# Patient Record
Sex: FEMALE | Race: WHITE | Employment: UNEMPLOYED | URBAN - NONMETROPOLITAN AREA
[De-identification: names, ages, dates, MRNs, and addresses within clinical notes are randomized per-mention and may not be internally consistent; named-entity substitution may affect disease eponyms.]

---

## 2017-07-25 ENCOUNTER — OFFICE VISIT (OUTPATIENT)
Dept: PRIMARY CARE CLINIC | Age: 42
End: 2017-07-25

## 2017-07-25 VITALS
TEMPERATURE: 99 F | HEART RATE: 89 BPM | OXYGEN SATURATION: 98 % | WEIGHT: 149 LBS | BODY MASS INDEX: 24.83 KG/M2 | HEIGHT: 65 IN

## 2017-07-25 DIAGNOSIS — L55.1 SUNBURN, BLISTERING: Primary | ICD-10-CM

## 2017-07-25 PROCEDURE — 99212 OFFICE O/P EST SF 10 MIN: CPT | Performed by: NURSE PRACTITIONER

## 2017-07-25 RX ORDER — PREDNISONE 10 MG/1
10 TABLET ORAL DAILY
Qty: 43 TABLET | Refills: 0 | Status: SHIPPED | OUTPATIENT
Start: 2017-07-25 | End: 2017-08-06

## 2017-07-25 ASSESSMENT — ENCOUNTER SYMPTOMS: GASTROINTESTINAL NEGATIVE: 1

## 2017-10-13 RX ORDER — VENLAFAXINE HYDROCHLORIDE 150 MG/1
150 CAPSULE, EXTENDED RELEASE ORAL DAILY
Qty: 30 CAPSULE | Refills: 11 | Status: SHIPPED | OUTPATIENT
Start: 2017-10-13 | End: 2017-11-01 | Stop reason: SDUPTHER

## 2017-10-13 NOTE — TELEPHONE ENCOUNTER
Received fax from pharmacy requesting refill on pts medication(s). Pt was last seen in office on 7/25/17 and has no follow up scheduled . Will send request to pharmacy for pt.     Requested Prescriptions     Signed Prescriptions Disp Refills    venlafaxine (EFFEXOR XR) 150 MG extended release capsule 30 capsule 11     Sig: Take 1 capsule by mouth daily     Authorizing Provider: Criss Alicea     Ordering User: Montserrat Clarke

## 2017-11-01 ENCOUNTER — OFFICE VISIT (OUTPATIENT)
Dept: PRIMARY CARE CLINIC | Age: 42
End: 2017-11-01

## 2017-11-01 VITALS
TEMPERATURE: 96.9 F | OXYGEN SATURATION: 96 % | BODY MASS INDEX: 25.74 KG/M2 | HEART RATE: 89 BPM | HEIGHT: 65 IN | SYSTOLIC BLOOD PRESSURE: 118 MMHG | DIASTOLIC BLOOD PRESSURE: 72 MMHG | WEIGHT: 154.5 LBS

## 2017-11-01 DIAGNOSIS — F41.9 ANXIETY: ICD-10-CM

## 2017-11-01 DIAGNOSIS — F33.41 RECURRENT MAJOR DEPRESSIVE DISORDER, IN PARTIAL REMISSION (HCC): Primary | ICD-10-CM

## 2017-11-01 PROCEDURE — 99213 OFFICE O/P EST LOW 20 MIN: CPT | Performed by: PEDIATRICS

## 2017-11-01 RX ORDER — VENLAFAXINE HYDROCHLORIDE 75 MG/1
150 CAPSULE, EXTENDED RELEASE ORAL DAILY
Qty: 30 CAPSULE | Refills: 3 | Status: SHIPPED | OUTPATIENT
Start: 2017-11-01 | End: 2019-09-16

## 2017-11-01 RX ORDER — VENLAFAXINE HYDROCHLORIDE 150 MG/1
150 CAPSULE, EXTENDED RELEASE ORAL DAILY
Qty: 30 CAPSULE | Refills: 11 | Status: SHIPPED | OUTPATIENT
Start: 2017-11-01 | End: 2020-09-02 | Stop reason: SDUPTHER

## 2017-11-01 ASSESSMENT — ENCOUNTER SYMPTOMS
COUGH: 0
SORE THROAT: 0
SINUS PRESSURE: 0
VOMITING: 0
NAUSEA: 0
EYE PAIN: 0
BACK PAIN: 0
DIARRHEA: 0
ABDOMINAL PAIN: 0
WHEEZING: 0
SHORTNESS OF BREATH: 0

## 2017-11-01 NOTE — PROGRESS NOTES
[address] [facility], [address]  Phone [phone]   Fax [phone]      OFFICE VISIT: 2017    Kelton Rouse- : 1975      HPI  Reason For Visit:  Andreas Dandy is a 38 y.o. Health Maintenance flu- decline  Lipid- has not had  Pap- over a year  HIV- decline  Date of Most Recent Physical:  Over a year    The patient presents today for medication management   She is feeling very tired all the time  She is having trouble sleeping, unless she takes melatonin    She is taking B12 supplementation  She does not feel refreshed in the morning. She generally avoids caffeine. She does not have any insurance now, so she does not want labs if she does not need them  She does note that the medications are helping her anxiety. She does note that her bp goes down at times and she tries to drink more and eat more salt. Bed 10:00 pm  Up around 7:00 am         height is 5' 5\" (1.651 m) and weight is 154 lb 8 oz (70.1 kg). Her temporal temperature is 96.9 °F (36.1 °C). Her blood pressure is 118/72 and her pulse is 89. Her oxygen saturation is 96%. Body mass index is 25.71 kg/m². I have reviewed the following with the MsJonathan Kalen   Lab Review   No visits with results within 6 Month(s) from this visit. Latest known visit with results is:   Office Visit on 2016   Component Date Value    Color, UA 2016 Dark Yellow/Orange     Clarity, UA 2016 Severly Cloudy     Glucose, UA POC 2016 Negative     Bilirubin, UA 2016 Negative     Ketones, UA 2016 Trace     Spec Grav, UA 2016 >=1.030     Blood, UA POC 2016 Large     pH, UA 2016 7.0     Protein, UA POC 2016 >=300     Urobilinogen, UA 2016 0.2     Leukocytes, UA 2016 Small     Nitrite, UA 2016 Negative      Copies of these are in the chart.     Current Outpatient Prescriptions   Medication Sig Dispense Refill    venlafaxine (EFFEXOR XR) 150 MG extended release capsule Take 1 capsule by mouth daily 30 capsule 11    venlafaxine (EFFEXOR XR) 75 MG extended release capsule Take 2 capsules by mouth daily 30 capsule 3    vitamin B-12 (CYANOCOBALAMIN) 1000 MCG tablet Take 1,000 mcg by mouth daily      cetirizine (ZYRTEC) 10 MG tablet Take 10 mg by mouth daily       No current facility-administered medications for this visit. Allergies: Percocet [oxycodone-acetaminophen]    Past Medical History:   Diagnosis Date    Allergic rhinitis        History reviewed. No pertinent surgical history. Social History   Substance Use Topics    Smoking status: Never Smoker    Smokeless tobacco: Not on file    Alcohol use No       Review of Systems   Constitutional: Negative for fatigue and unexpected weight change. HENT: Negative for congestion, ear pain, sinus pressure and sore throat. Eyes: Negative for pain and visual disturbance. Respiratory: Negative for cough, shortness of breath and wheezing. Cardiovascular: Negative for chest pain, palpitations and leg swelling. Gastrointestinal: Negative for abdominal pain, diarrhea, nausea and vomiting. Endocrine: Negative for polyuria. Genitourinary: Negative for dysuria, frequency, hematuria and urgency. Musculoskeletal: Negative for back pain and neck pain. Skin: Negative for rash. Neurological: Negative for dizziness and headaches. Psychiatric/Behavioral: Negative for self-injury. The patient is not nervous/anxious. Physical Exam   Constitutional: She is oriented to person, place, and time. She appears well-developed and well-nourished. She is cooperative. Non-toxic appearance. No distress. Body habitus is    HENT:   Head: Normocephalic and atraumatic.    Right Ear: Hearing, tympanic membrane, external ear and ear canal normal.   Left Ear: Hearing, tympanic membrane, external ear and ear canal normal.   Nose: Nose normal.   Mouth/Throat: Oropharynx is clear and moist and mucous membranes are normal.   Eyes: Conjunctivae, EOM and lids are normal. Pupils are equal, round, and reactive to light. Neck: Phonation normal. Neck supple. No JVD present. Carotid bruit is not present. No thyromegaly present. Cardiovascular: Normal rate, regular rhythm and normal heart sounds. No extrasystoles are present. PMI is not displaced. Exam reveals no gallop and no friction rub. No murmur heard. Pulmonary/Chest: Effort normal and breath sounds normal. No respiratory distress. She has no wheezes. She has no rhonchi. She has no rales. Abdominal: Soft. Bowel sounds are normal. She exhibits no distension and no mass. There is no hepatosplenomegaly. There is no tenderness. There is no CVA tenderness. Genitourinary:   Genitourinary Comments: Examination deferred   Musculoskeletal: Normal range of motion. She exhibits no edema. Joint examination reveals no acute arthritis or synovitis. Lymphadenopathy:     She has no cervical adenopathy. Neurological: She is alert and oriented to person, place, and time. She has normal strength. She displays no atrophy and no tremor. No cranial nerve deficit (by gross examination) or sensory deficit. Gait normal.   Nio focal deficits appreciated   Skin: Skin is warm and dry. No rash noted. Psychiatric: She has a normal mood and affect. Her speech is normal and behavior is normal.   Vitals reviewed. ASSESSMENT      ICD-10-CM ICD-9-CM    1. Recurrent major depressive disorder, in partial remission ([facility]) F33.41 296.35 venlafaxine (EFFEXOR XR) 150 MG extended release capsule      venlafaxine (EFFEXOR XR) 75 MG extended release capsule   2. Anxiety F41.9 300.00 venlafaxine (EFFEXOR XR) 150 MG extended release capsule      venlafaxine (EFFEXOR XR) 75 MG extended release capsule       PLAN      ICD-10-CM ICD-9-CM    1.  Recurrent major depressive disorder, in partial remission ([facility]) F33.41 296.35 venlafaxine (EFFEXOR XR) 150 MG extended release capsule venlafaxine (EFFEXOR XR) 75 MG extended release capsule   2. Anxiety F41.9 300.00 venlafaxine (EFFEXOR XR) 150 MG extended release capsule      venlafaxine (EFFEXOR XR) 75 MG extended release capsule       No orders of the defined types were placed in this encounter. Return if symptoms worsen or fail to improve. There are no Patient Instructions on file for this visit. Additional Instructions: As always, patient is advised to bring in medication bottles in order to correctly reconcile with our current list.    Anat Zapata received counseling on the following healthy behaviors: medication adjustment       Discussed use, benefit, and side effects of prescribed medications. Barriers to medication compliance addressed. All patient questions answered. Pt voiced understanding. If you need to reach us for an appointment or any other urgent   scheduling issue,   please press the (*) sign at the beginning of the phone tree prompt after   Dialing the normal office number.     [address] [facility],[address], DO

## 2018-02-26 ENCOUNTER — OFFICE VISIT (OUTPATIENT)
Dept: PRIMARY CARE CLINIC | Age: 43
End: 2018-02-26

## 2018-02-26 VITALS
SYSTOLIC BLOOD PRESSURE: 110 MMHG | TEMPERATURE: 97.4 F | BODY MASS INDEX: 26.49 KG/M2 | HEART RATE: 96 BPM | OXYGEN SATURATION: 96 % | WEIGHT: 159 LBS | DIASTOLIC BLOOD PRESSURE: 78 MMHG | HEIGHT: 65 IN

## 2018-02-26 DIAGNOSIS — H60.392 CHRONIC BACTERIAL OTITIS EXTERNA OF LEFT EAR: Primary | ICD-10-CM

## 2018-02-26 PROCEDURE — 99213 OFFICE O/P EST LOW 20 MIN: CPT | Performed by: PEDIATRICS

## 2018-02-26 RX ORDER — CEPHALEXIN 500 MG/1
500 CAPSULE ORAL 3 TIMES DAILY
Qty: 30 CAPSULE | Refills: 0 | Status: SHIPPED | OUTPATIENT
Start: 2018-02-26 | End: 2019-09-16

## 2018-02-26 NOTE — PROGRESS NOTES
[address] [facility], [address]  Phone [phone]   Fax [phone]      OFFICE VISIT: 2018    Garrick Rome- : 1975      HPI  Reason For Visit:  Dusty Chadwick is a 37 y.o. Health Maintenance    Insect Bite (in left ear)      See the above chief complaint. She has a red swollen R outer ear. She believes this may be due to a bug bite. height is 5' 5\" (1.651 m) and weight is 159 lb (72.1 kg). Her temporal temperature is 97.4 °F (36.3 °C). Her blood pressure is 110/78 and her pulse is 96. Her oxygen saturation is 96%. Body mass index is 26.46 kg/m². I have reviewed the following with the Ms. Higuera   Lab Review   No visits with results within 6 Month(s) from this visit. Latest known visit with results is:   Office Visit on 2016   Component Date Value    Color, UA 2016 Dark Yellow/Orange     Clarity, UA 2016 Severly Cloudy     Glucose, UA POC 2016 Negative     Bilirubin, UA 2016 Negative     Ketones, UA 2016 Trace     Spec Grav, UA 2016 >=1.030     Blood, UA POC 2016 Large     pH, UA 2016 7.0     Protein, UA POC 2016 >=300     Urobilinogen, UA 2016 0.2     Leukocytes, UA 2016 Small     Nitrite, UA 2016 Negative      Copies of these are in the chart. Current Outpatient Prescriptions   Medication Sig Dispense Refill    cephALEXin (KEFLEX) 500 MG capsule Take 1 capsule by mouth 3 times daily 30 capsule 0    venlafaxine (EFFEXOR XR) 150 MG extended release capsule Take 1 capsule by mouth daily 30 capsule 11    venlafaxine (EFFEXOR XR) 75 MG extended release capsule Take 2 capsules by mouth daily 30 capsule 3    vitamin B-12 (CYANOCOBALAMIN) 1000 MCG tablet Take 1,000 mcg by mouth daily      cetirizine (ZYRTEC) 10 MG tablet Take 10 mg by mouth daily       No current facility-administered medications for this visit.         Allergies: Percocet [oxycodone-acetaminophen]     Past Medical History:   Diagnosis Date    Allergic rhinitis        History reviewed. No pertinent surgical history. Social History   Substance Use Topics    Smoking status: Never Smoker    Smokeless tobacco: Never Used    Alcohol use No        Review of Systems    Physical Exam   Constitutional: She is oriented to person, place, and time. She appears well-developed and well-nourished. She is cooperative. Non-toxic appearance. No distress. Body habitus is    HENT:   Head: Normocephalic and atraumatic. Right Ear: Hearing, tympanic membrane, external ear and ear canal normal.   Left Ear: Hearing, tympanic membrane, external ear and ear canal normal.   Ears:    Nose: Nose normal.   Mouth/Throat: Oropharynx is clear and moist and mucous membranes are normal.   There is redness at the Left ear at the pinna. There is an abrasion of the inner aspect of the area. Eyes: Conjunctivae, EOM and lids are normal. Pupils are equal, round, and reactive to light. Neck: Phonation normal. Neck supple. No JVD present. Carotid bruit is not present. No thyromegaly present. Cardiovascular: Normal rate, regular rhythm and normal heart sounds. No extrasystoles are present. PMI is not displaced. Exam reveals no gallop and no friction rub. No murmur heard. Pulmonary/Chest: Effort normal and breath sounds normal. No respiratory distress. She has no wheezes. She has no rhonchi. She has no rales. Abdominal: Soft. Bowel sounds are normal. She exhibits no distension and no mass. There is no hepatosplenomegaly. There is no tenderness. There is no CVA tenderness. Genitourinary:   Genitourinary Comments: Examination deferred   Musculoskeletal: Normal range of motion. She exhibits no edema. Joint examination reveals no acute arthritis or synovitis. Lymphadenopathy:     She has no cervical adenopathy. Neurological: She is alert and oriented to person, place, and time.  She has normal

## 2019-07-03 DIAGNOSIS — L55.1 SUNBURN, BLISTERING: ICD-10-CM

## 2019-09-16 ENCOUNTER — OFFICE VISIT (OUTPATIENT)
Dept: PRIMARY CARE CLINIC | Age: 44
End: 2019-09-16

## 2019-09-16 VITALS
TEMPERATURE: 97.3 F | SYSTOLIC BLOOD PRESSURE: 126 MMHG | HEIGHT: 65 IN | WEIGHT: 143 LBS | HEART RATE: 53 BPM | DIASTOLIC BLOOD PRESSURE: 76 MMHG | OXYGEN SATURATION: 97 % | BODY MASS INDEX: 23.82 KG/M2

## 2019-09-16 DIAGNOSIS — F41.9 ANXIETY: ICD-10-CM

## 2019-09-16 DIAGNOSIS — F33.41 RECURRENT MAJOR DEPRESSIVE DISORDER, IN PARTIAL REMISSION (HCC): ICD-10-CM

## 2019-09-16 PROCEDURE — 99213 OFFICE O/P EST LOW 20 MIN: CPT | Performed by: NURSE PRACTITIONER

## 2019-09-16 RX ORDER — VENLAFAXINE HYDROCHLORIDE 150 MG/1
150 CAPSULE, EXTENDED RELEASE ORAL DAILY
Qty: 30 CAPSULE | Refills: 11 | Status: SHIPPED | OUTPATIENT
Start: 2019-09-16 | End: 2021-03-31 | Stop reason: SDUPTHER

## 2019-09-16 RX ORDER — VENLAFAXINE HYDROCHLORIDE 150 MG/1
150 CAPSULE, EXTENDED RELEASE ORAL DAILY
Qty: 30 CAPSULE | Refills: 11 | Status: CANCELLED | OUTPATIENT
Start: 2019-09-16

## 2019-09-16 RX ORDER — VENLAFAXINE HYDROCHLORIDE 75 MG/1
75 CAPSULE, EXTENDED RELEASE ORAL DAILY
Qty: 30 CAPSULE | Refills: 11 | Status: SHIPPED | OUTPATIENT
Start: 2019-09-16 | End: 2020-10-27 | Stop reason: SDUPTHER

## 2019-09-16 RX ORDER — CHOLECALCIFEROL (VITAMIN D3) 125 MCG
5 CAPSULE ORAL DAILY
COMMUNITY

## 2019-09-16 ASSESSMENT — PATIENT HEALTH QUESTIONNAIRE - PHQ9
2. FEELING DOWN, DEPRESSED OR HOPELESS: 0
SUM OF ALL RESPONSES TO PHQ QUESTIONS 1-9: 0
1. LITTLE INTEREST OR PLEASURE IN DOING THINGS: 0
SUM OF ALL RESPONSES TO PHQ QUESTIONS 1-9: 0
SUM OF ALL RESPONSES TO PHQ9 QUESTIONS 1 & 2: 0

## 2019-09-16 ASSESSMENT — ENCOUNTER SYMPTOMS
EYE REDNESS: 0
DIARRHEA: 0
SORE THROAT: 0
COUGH: 0
WHEEZING: 0
RHINORRHEA: 0
ABDOMINAL PAIN: 0
EYE DISCHARGE: 0
TROUBLE SWALLOWING: 0
CONSTIPATION: 0
BLOOD IN STOOL: 0

## 2020-01-30 ENCOUNTER — OFFICE VISIT (OUTPATIENT)
Dept: FAMILY MEDICINE CLINIC | Age: 45
End: 2020-01-30

## 2020-01-30 VITALS
BODY MASS INDEX: 24.99 KG/M2 | RESPIRATION RATE: 20 BRPM | TEMPERATURE: 98.4 F | SYSTOLIC BLOOD PRESSURE: 94 MMHG | HEIGHT: 65 IN | DIASTOLIC BLOOD PRESSURE: 62 MMHG | WEIGHT: 150 LBS | HEART RATE: 80 BPM | OXYGEN SATURATION: 98 %

## 2020-01-30 PROCEDURE — 99213 OFFICE O/P EST LOW 20 MIN: CPT | Performed by: FAMILY MEDICINE

## 2020-01-30 RX ORDER — ONDANSETRON 4 MG/1
4 TABLET, ORALLY DISINTEGRATING ORAL 3 TIMES DAILY PRN
Qty: 30 TABLET | Refills: 0 | Status: SHIPPED | OUTPATIENT
Start: 2020-01-30 | End: 2020-09-02

## 2020-01-30 NOTE — PROGRESS NOTES
Claudia Tang is a 50 y.o. female who presents today for   Chief Complaint   Patient presents with    Headache    Nausea    Emesis    Diarrhea       Chief Complaint: Sinus problems    HPI  Patient reports that she has been having some sinus problems with fatigue and some nausea that is been ongoing for the past 2 days. She states that she started having some vomiting today but seems to be doing better. She has had some drainage going on back of her throat but denies any fever. She did take some allergy medicine last night and some aspirin without any significant benefits to her symptoms. She denies any other aggravating or relieving factors for symptoms. She denies any known sick contacts. Review of Systems   Constitutional: Positive for fatigue. Negative for activity change, appetite change and fever. HENT: Positive for congestion, postnasal drip and rhinorrhea. Negative for sore throat. Eyes: Negative for pain and discharge. Respiratory: Negative for cough and shortness of breath. Cardiovascular: Negative for chest pain and palpitations. Gastrointestinal: Positive for nausea and vomiting. Negative for abdominal pain, constipation and diarrhea. Endocrine: Negative for cold intolerance and heat intolerance. Genitourinary: Negative for dysuria and hematuria. Musculoskeletal: Negative for gait problem and neck pain. Skin: Negative for rash and wound.        Past Medical History:   Diagnosis Date    Allergic rhinitis        Current Outpatient Medications   Medication Sig Dispense Refill    ondansetron (ZOFRAN-ODT) 4 MG disintegrating tablet Take 1 tablet by mouth 3 times daily as needed for Nausea or Vomiting 30 tablet 0    melatonin 5 MG TABS tablet Take 5 mg by mouth daily      venlafaxine (EFFEXOR XR) 75 MG extended release capsule Take 1 capsule by mouth daily Take with 150mg 30 capsule 11    venlafaxine (EFFEXOR XR) 150 MG extended release capsule Take 1 capsule by mouth daily needed. Patient agrees to go to ER if condition becomes emergent. Return if symptoms worsen or fail to improve, for next scheduled follow up with PCP.

## 2020-02-04 ASSESSMENT — ENCOUNTER SYMPTOMS
CONSTIPATION: 0
RHINORRHEA: 1
DIARRHEA: 0
NAUSEA: 1
ABDOMINAL PAIN: 0
SHORTNESS OF BREATH: 0
VOMITING: 1
EYE PAIN: 0
COUGH: 0
SORE THROAT: 0
EYE DISCHARGE: 0

## 2020-09-02 ENCOUNTER — OFFICE VISIT (OUTPATIENT)
Dept: PRIMARY CARE CLINIC | Age: 45
End: 2020-09-02
Payer: MEDICAID

## 2020-09-02 VITALS
OXYGEN SATURATION: 98 % | HEART RATE: 88 BPM | SYSTOLIC BLOOD PRESSURE: 118 MMHG | BODY MASS INDEX: 24.92 KG/M2 | HEIGHT: 65 IN | DIASTOLIC BLOOD PRESSURE: 78 MMHG | WEIGHT: 149.56 LBS | TEMPERATURE: 97.5 F

## 2020-09-02 DIAGNOSIS — Z00.00 VISIT FOR PREVENTIVE HEALTH EXAMINATION: ICD-10-CM

## 2020-09-02 LAB
ALBUMIN SERPL-MCNC: 4.3 G/DL (ref 3.5–5.2)
ALP BLD-CCNC: 72 U/L (ref 35–104)
ALT SERPL-CCNC: 15 U/L (ref 5–33)
ANION GAP SERPL CALCULATED.3IONS-SCNC: 17 MMOL/L (ref 7–19)
AST SERPL-CCNC: 18 U/L (ref 5–32)
BASOPHILS ABSOLUTE: 0 K/UL (ref 0–0.2)
BASOPHILS RELATIVE PERCENT: 0.2 % (ref 0–1)
BILIRUB SERPL-MCNC: 0.4 MG/DL (ref 0.2–1.2)
BUN BLDV-MCNC: 14 MG/DL (ref 6–20)
CALCIUM SERPL-MCNC: 9.3 MG/DL (ref 8.6–10)
CHLORIDE BLD-SCNC: 101 MMOL/L (ref 98–111)
CHOLESTEROL, TOTAL: 241 MG/DL (ref 160–199)
CO2: 21 MMOL/L (ref 22–29)
CREAT SERPL-MCNC: 0.7 MG/DL (ref 0.5–0.9)
CREATININE URINE: 117.3 MG/DL (ref 4.2–622)
EOSINOPHILS ABSOLUTE: 0.3 K/UL (ref 0–0.6)
EOSINOPHILS RELATIVE PERCENT: 3.3 % (ref 0–5)
GFR AFRICAN AMERICAN: >59
GFR NON-AFRICAN AMERICAN: >60
GLUCOSE BLD-MCNC: 82 MG/DL (ref 74–109)
HCT VFR BLD CALC: 45.4 % (ref 37–47)
HDLC SERPL-MCNC: 63 MG/DL (ref 65–121)
HEMOGLOBIN: 14.7 G/DL (ref 12–16)
IMMATURE GRANULOCYTES #: 0 K/UL
LDL CHOLESTEROL CALCULATED: 164 MG/DL
LYMPHOCYTES ABSOLUTE: 2.6 K/UL (ref 1.1–4.5)
LYMPHOCYTES RELATIVE PERCENT: 31.5 % (ref 20–40)
MCH RBC QN AUTO: 31.4 PG (ref 27–31)
MCHC RBC AUTO-ENTMCNC: 32.4 G/DL (ref 33–37)
MCV RBC AUTO: 97 FL (ref 81–99)
MICROALBUMIN UR-MCNC: <1.2 MG/DL (ref 0–19)
MICROALBUMIN/CREAT UR-RTO: NORMAL MG/G
MONOCYTES ABSOLUTE: 0.7 K/UL (ref 0–0.9)
MONOCYTES RELATIVE PERCENT: 8 % (ref 0–10)
NEUTROPHILS ABSOLUTE: 4.7 K/UL (ref 1.5–7.5)
NEUTROPHILS RELATIVE PERCENT: 56.8 % (ref 50–65)
PDW BLD-RTO: 12 % (ref 11.5–14.5)
PLATELET # BLD: 308 K/UL (ref 130–400)
PMV BLD AUTO: 10.9 FL (ref 9.4–12.3)
POTASSIUM SERPL-SCNC: 4 MMOL/L (ref 3.5–5)
RBC # BLD: 4.68 M/UL (ref 4.2–5.4)
SODIUM BLD-SCNC: 139 MMOL/L (ref 136–145)
T4 FREE: 1.11 NG/DL (ref 0.93–1.7)
TOTAL PROTEIN: 7.1 G/DL (ref 6.6–8.7)
TRIGL SERPL-MCNC: 69 MG/DL (ref 0–149)
TSH SERPL DL<=0.05 MIU/L-ACNC: 1.41 UIU/ML (ref 0.27–4.2)
WBC # BLD: 8.2 K/UL (ref 4.8–10.8)

## 2020-09-02 PROCEDURE — 99396 PREV VISIT EST AGE 40-64: CPT | Performed by: PEDIATRICS

## 2020-09-02 ASSESSMENT — PATIENT HEALTH QUESTIONNAIRE - PHQ9
1. LITTLE INTEREST OR PLEASURE IN DOING THINGS: 0
SUM OF ALL RESPONSES TO PHQ QUESTIONS 1-9: 0
2. FEELING DOWN, DEPRESSED OR HOPELESS: 0
SUM OF ALL RESPONSES TO PHQ QUESTIONS 1-9: 0
SUM OF ALL RESPONSES TO PHQ9 QUESTIONS 1 & 2: 0

## 2020-09-02 ASSESSMENT — ENCOUNTER SYMPTOMS
NAUSEA: 0
EYE PAIN: 0
SINUS PRESSURE: 0
VOMITING: 0
SORE THROAT: 0
WHEEZING: 0
SHORTNESS OF BREATH: 0
DIARRHEA: 0
BACK PAIN: 0
ABDOMINAL PAIN: 0
COUGH: 0

## 2020-09-02 NOTE — PROGRESS NOTES
[facility], [address]  Phone [phone]   Fax [phone]      OFFICE VISIT: 9/2/2020    Eligha Gilford: 1975      HPI  Reason For Visit:  Romero Aponte is a 50 y.o. Annual Exam; Fatigue (extra fatigue around monthly cycles, wonders if she is starting to go through menopause. ); Other (urine \"dribbing\" when having to go to the bathroom, some days she cant hold it while others she has no issues. ); Knee Pain (pulled muscle around left knee months ago while staining deck, doesnt seem to be getting better. ); and Health Maintenance (cervical screen-9/2/20 [facility])    Patient presents for routine annual wellness evaluation. She also presents with multiple other health issues. Present concerns:  Some urge incontinence. She is doing Kaegel exercises. Major depressive disorder:  Medication:   Effexor  mg (150 mg and 75 mg) daily  Symptoms:doing fairly well. B12 deficiency:  Medication:   Vitamin B12 1000 mcg orally daily  Symptoms:some fatigue      Environmental allergies:  Medication:   Zyrtec 10 mg daily  Symptoms:controlled      Insomnia:  Medication:   Melatonin 5 mg nightly  Symptoms: doing well       height is 5' 5\" (1.651 m) and weight is 149 lb 9 oz (67.8 kg). Her temporal temperature is 97.5 °F (36.4 °C). Her blood pressure is 118/78 and her pulse is 88. Her oxygen saturation is 98%. Body mass index is 24.89 kg/m². I have reviewed the following with the Ms. Higuera   Lab Review  No visits with results within 6 Month(s) from this visit.    Latest known visit with results is:   Office Visit on 11/21/2016   Component Date Value    Color, UA 11/21/2016 Dark Yellow/Orange     Clarity, UA 11/21/2016 Severly Cloudy     Glucose, UA POC 11/21/2016 Negative     Bilirubin, UA 11/21/2016 Negative     Ketones, UA 11/21/2016 Trace     Spec Grav, UA 11/21/2016 >=1.030     Blood, UA POC 11/21/2016 Large     pH, UA 11/21/2016 7.0     Protein, UA POC distress. Breath sounds: Normal breath sounds. No wheezing, rhonchi or rales. Abdominal:      General: Bowel sounds are normal. There is no distension. Palpations: Abdomen is soft. There is no mass. Tenderness: There is no abdominal tenderness. Genitourinary:     Comments: Examination deferred  Musculoskeletal: Normal range of motion. Comments: Joint examination reveals no acute arthritis or synovitis. Lymphadenopathy:      Cervical: No cervical adenopathy. Skin:     General: Skin is warm and dry. Capillary Refill: Capillary refill takes less than 2 seconds. Findings: No rash. Neurological:      General: No focal deficit present. Mental Status: She is alert and oriented to person, place, and time. Cranial Nerves: No cranial nerve deficit (by gross examination). Sensory: No sensory deficit. Motor: No tremor or atrophy. Gait: Gait normal.      Comments: No focal deficits appreciated   Psychiatric:         Mood and Affect: Mood normal.         Speech: Speech normal.         Behavior: Behavior normal. Behavior is cooperative. ASSESSMENT      ICD-10-CM    1. Visit for preventive health examination  Z00.00 CBC Auto Differential     Comprehensive Metabolic Panel     FREE T4     Lipid Panel     Microalbumin / Creatinine Urine Ratio     TSH without Reflex   2. [facility]  F33.41    3. Vitamin B12 deficiency  E53.8    4. Environmental allergies  Z91.09    5. Primary insomnia  F51.01          PLAN    1. Visit for preventive health examination  Routine age-appropriate anticipatory guidance was provided. Annual wellness visit was performed in a separate note and issues were addressed as identified.   - CBC Auto Differential; Future  - Comprehensive Metabolic Panel; Future  - FREE T4; Future  - Lipid Panel; Future  - Microalbumin / Creatinine Urine Ratio; Future  - TSH without Reflex; Future    2.

## 2020-09-03 ENCOUNTER — TELEPHONE (OUTPATIENT)
Dept: PRIMARY CARE CLINIC | Age: 45
End: 2020-09-03

## 2020-09-03 NOTE — TELEPHONE ENCOUNTER
----- Message from [address] [employer] [place], DO sent at 9/3/2020 10:52 AM CDT -----  LDL cholesterol significantly elevated. Recommend Crestor 20mg nightly to get this down. Dispense #30 with 11 refills. Recheck lipids in 4 to 6 weeks. Your metabolic profile is normal.  This includes kidney and liver functions as well as electrolytes. Thyroid values are normal.  There is no significant protein excretion in the urine. Your WBC, (infection fighting ability) Hgb and Hct, (oxygen carrying cells) are normal; as is your percentage of each cell type.

## 2020-09-04 RX ORDER — ROSUVASTATIN CALCIUM 20 MG/1
20 TABLET, COATED ORAL NIGHTLY
Qty: 30 TABLET | Refills: 3 | Status: SHIPPED | OUTPATIENT
Start: 2020-09-04 | End: 2022-03-15 | Stop reason: SDUPTHER

## 2020-10-27 ENCOUNTER — TELEPHONE (OUTPATIENT)
Dept: PRIMARY CARE CLINIC | Age: 45
End: 2020-10-27

## 2020-10-27 DIAGNOSIS — E78.2 MIXED HYPERLIPIDEMIA: ICD-10-CM

## 2020-10-27 DIAGNOSIS — Z79.899 MEDICATION MANAGEMENT: ICD-10-CM

## 2020-10-27 LAB
ALBUMIN SERPL-MCNC: 3.8 G/DL (ref 3.5–5.2)
ALP BLD-CCNC: 76 U/L (ref 35–104)
ALT SERPL-CCNC: 10 U/L (ref 5–33)
ANION GAP SERPL CALCULATED.3IONS-SCNC: 7 MMOL/L (ref 7–19)
AST SERPL-CCNC: 17 U/L (ref 5–32)
BILIRUB SERPL-MCNC: 0.3 MG/DL (ref 0.2–1.2)
BUN BLDV-MCNC: 16 MG/DL (ref 6–20)
CALCIUM SERPL-MCNC: 8.8 MG/DL (ref 8.6–10)
CHLORIDE BLD-SCNC: 106 MMOL/L (ref 98–111)
CHOLESTEROL, FASTING: 176 MG/DL (ref 160–199)
CO2: 26 MMOL/L (ref 22–29)
CREAT SERPL-MCNC: 0.7 MG/DL (ref 0.5–0.9)
GFR AFRICAN AMERICAN: >59
GFR NON-AFRICAN AMERICAN: >60
GLUCOSE BLD-MCNC: 84 MG/DL (ref 74–109)
HDLC SERPL-MCNC: 56 MG/DL (ref 65–121)
LDL CHOLESTEROL CALCULATED: 110 MG/DL
POTASSIUM SERPL-SCNC: 3.8 MMOL/L (ref 3.5–5)
SODIUM BLD-SCNC: 139 MMOL/L (ref 136–145)
TOTAL PROTEIN: 6.5 G/DL (ref 6.6–8.7)
TRIGLYCERIDE, FASTING: 52 MG/DL (ref 0–149)

## 2020-10-27 RX ORDER — VENLAFAXINE HYDROCHLORIDE 75 MG/1
75 CAPSULE, EXTENDED RELEASE ORAL DAILY
Qty: 30 CAPSULE | Refills: 11 | Status: SHIPPED | OUTPATIENT
Start: 2020-10-27 | End: 2021-03-31 | Stop reason: SDUPTHER

## 2020-10-27 RX ORDER — EZETIMIBE 10 MG/1
10 TABLET ORAL DAILY
Qty: 30 TABLET | Refills: 5 | Status: SHIPPED | OUTPATIENT
Start: 2020-10-27 | End: 2022-03-15 | Stop reason: SDUPTHER

## 2020-10-27 NOTE — TELEPHONE ENCOUNTER
----- Message from NOEL Moore sent at 10/27/2020  1:08 PM CDT -----  Lipids LDL improving but not at goal are you taking the crestor nightly ?   Suggest cont present can consider adding zetia 10mg 1 po daily #30 [id number]  Cmp electrolytes liver and kidneys wnl

## 2021-03-31 DIAGNOSIS — F41.9 ANXIETY: ICD-10-CM

## 2021-03-31 DIAGNOSIS — F33.41 RECURRENT MAJOR DEPRESSIVE DISORDER, IN PARTIAL REMISSION (HCC): ICD-10-CM

## 2021-03-31 RX ORDER — VENLAFAXINE HYDROCHLORIDE 150 MG/1
150 CAPSULE, EXTENDED RELEASE ORAL DAILY
Qty: 90 CAPSULE | Refills: 1 | Status: SHIPPED | OUTPATIENT
Start: 2021-03-31 | End: 2022-03-15 | Stop reason: SDUPTHER

## 2021-03-31 RX ORDER — VENLAFAXINE HYDROCHLORIDE 75 MG/1
75 CAPSULE, EXTENDED RELEASE ORAL DAILY
Qty: 90 CAPSULE | Refills: 1 | Status: SHIPPED | OUTPATIENT
Start: 2021-03-31 | End: 2022-03-15 | Stop reason: SDUPTHER

## 2021-03-31 NOTE — TELEPHONE ENCOUNTER
Received fax from pharmacy requesting refill on pts medication(s). Pt was last seen in office on 9/2/2020  and has a follow up scheduled for Visit date not found. Will send request to  Sue Blackwood  for patient.      Requested Prescriptions     Pending Prescriptions Disp Refills    venlafaxine (EFFEXOR XR) 75 MG extended release capsule 90 capsule 3     Sig: Take 1 capsule by mouth daily Take with 150mg    venlafaxine (EFFEXOR XR) 150 MG extended release capsule 90 capsule 3     Sig: Take 1 capsule by mouth daily

## 2022-03-15 ENCOUNTER — OFFICE VISIT (OUTPATIENT)
Dept: PRIMARY CARE CLINIC | Age: 47
End: 2022-03-15
Payer: COMMERCIAL

## 2022-03-15 VITALS
OXYGEN SATURATION: 98 % | WEIGHT: 155 LBS | BODY MASS INDEX: 25.79 KG/M2 | SYSTOLIC BLOOD PRESSURE: 122 MMHG | HEART RATE: 105 BPM | DIASTOLIC BLOOD PRESSURE: 84 MMHG | TEMPERATURE: 97 F

## 2022-03-15 DIAGNOSIS — F33.41 RECURRENT MAJOR DEPRESSIVE DISORDER, IN PARTIAL REMISSION (HCC): ICD-10-CM

## 2022-03-15 DIAGNOSIS — Z00.00 ENCOUNTER FOR WELL ADULT EXAM WITHOUT ABNORMAL FINDINGS: Primary | ICD-10-CM

## 2022-03-15 DIAGNOSIS — R53.83 FATIGUE, UNSPECIFIED TYPE: ICD-10-CM

## 2022-03-15 DIAGNOSIS — Z12.31 ENCOUNTER FOR SCREENING MAMMOGRAM FOR MALIGNANT NEOPLASM OF BREAST: ICD-10-CM

## 2022-03-15 DIAGNOSIS — F41.9 ANXIETY: ICD-10-CM

## 2022-03-15 PROCEDURE — 99396 PREV VISIT EST AGE 40-64: CPT | Performed by: NURSE PRACTITIONER

## 2022-03-15 RX ORDER — VENLAFAXINE HYDROCHLORIDE 75 MG/1
75 CAPSULE, EXTENDED RELEASE ORAL DAILY
Qty: 90 CAPSULE | Refills: 1 | Status: SHIPPED | OUTPATIENT
Start: 2022-03-15

## 2022-03-15 RX ORDER — VENLAFAXINE HYDROCHLORIDE 150 MG/1
150 CAPSULE, EXTENDED RELEASE ORAL DAILY
Qty: 90 CAPSULE | Refills: 1 | Status: SHIPPED | OUTPATIENT
Start: 2022-03-15

## 2022-03-15 RX ORDER — ROSUVASTATIN CALCIUM 20 MG/1
20 TABLET, COATED ORAL NIGHTLY
Qty: 30 TABLET | Refills: 3 | Status: SHIPPED | OUTPATIENT
Start: 2022-03-15 | End: 2022-07-01 | Stop reason: SDUPTHER

## 2022-03-15 RX ORDER — BUPROPION HYDROCHLORIDE 150 MG/1
150 TABLET, EXTENDED RELEASE ORAL DAILY
Qty: 30 TABLET | Refills: 3 | Status: SHIPPED | OUTPATIENT
Start: 2022-03-15 | End: 2022-07-01 | Stop reason: SDUPTHER

## 2022-03-15 RX ORDER — EZETIMIBE 10 MG/1
10 TABLET ORAL DAILY
Qty: 30 TABLET | Refills: 5 | Status: SHIPPED | OUTPATIENT
Start: 2022-03-15

## 2022-03-15 ASSESSMENT — PATIENT HEALTH QUESTIONNAIRE - PHQ9
SUM OF ALL RESPONSES TO PHQ QUESTIONS 1-9: 3
2. FEELING DOWN, DEPRESSED OR HOPELESS: 0
SUM OF ALL RESPONSES TO PHQ QUESTIONS 1-9: 3
7. TROUBLE CONCENTRATING ON THINGS, SUCH AS READING THE NEWSPAPER OR WATCHING TELEVISION: 0
SUM OF ALL RESPONSES TO PHQ QUESTIONS 1-9: 3
5. POOR APPETITE OR OVEREATING: 0
8. MOVING OR SPEAKING SO SLOWLY THAT OTHER PEOPLE COULD HAVE NOTICED. OR THE OPPOSITE, BEING SO FIGETY OR RESTLESS THAT YOU HAVE BEEN MOVING AROUND A LOT MORE THAN USUAL: 0
10. IF YOU CHECKED OFF ANY PROBLEMS, HOW DIFFICULT HAVE THESE PROBLEMS MADE IT FOR YOU TO DO YOUR WORK, TAKE CARE OF THINGS AT HOME, OR GET ALONG WITH OTHER PEOPLE: 0
3. TROUBLE FALLING OR STAYING ASLEEP: 1
6. FEELING BAD ABOUT YOURSELF - OR THAT YOU ARE A FAILURE OR HAVE LET YOURSELF OR YOUR FAMILY DOWN: 1
4. FEELING TIRED OR HAVING LITTLE ENERGY: 1
9. THOUGHTS THAT YOU WOULD BE BETTER OFF DEAD, OR OF HURTING YOURSELF: 0
SUM OF ALL RESPONSES TO PHQ QUESTIONS 1-9: 3

## 2022-03-15 ASSESSMENT — ENCOUNTER SYMPTOMS
NAUSEA: 0
SINUS PRESSURE: 0
VOMITING: 0
SHORTNESS OF BREATH: 0
SORE THROAT: 0
BACK PAIN: 0
COUGH: 0
EYE PAIN: 0
DIARRHEA: 0
WHEEZING: 0
ABDOMINAL PAIN: 0

## 2022-03-15 NOTE — PATIENT INSTRUCTIONS
Patient Education        Learning About Breast Cancer Screening  What is breast cancer screening? Breast cancer occurs when cells that are not normal grow in one or both of your breasts. Screening tests can help find breast cancer early. Cancer is easier to treat when it's found early. Having concerns about breast cancer is common. That's why it's important to talk with your doctor about when to start and how often to get screened for breast cancer. How is breast cancer screening done? Several screening tests can be used to check for breast cancer. Mammograms. These tests check for signs of cancer using X-rays. They can show tumors that are too small for you or your doctor to feel. During a mammogram, a machine squeezes your breasts to make them flatter and easier to X-ray. At least two pictures are taken of each breast. One is taken from the top and one from the side. 3-D mammograms. These tests are also called digital breast tomosynthesis. Your breast is positioned on a flat plate. A top plate is pressed against your breast to keep it in position. The X-ray arm then moves in an arc above the breast and takes many pictures. A computer uses these X-rays to create a three-dimensional image. Clinical breast exam.  In this exam, your doctor carefully feels your breasts and under your arms to check for lumps or other changes. Who should be screened for breast cancer? Experts agree that mammograms are the best screening test for people at average risk of breast cancer. But they don't all agree on the age at which screening should start. And they don't agree on whether it's better to be screened every year or every two years. Here are some of the recommendations from experts:  · Start by age 36 and have a mammogram each year. · Start at age 39 and have a mammogram each year. · Start at age 48 and have a mammogram every 2 years. When to stop having mammograms is another decision.  You and your doctor can information. Patient Education        Anxiety Disorder: Care Instructions  Your Care Instructions     Anxiety is a normal reaction to stress. Difficult situations can cause you to have symptoms such as sweaty palms and a nervous feeling. In an anxiety disorder, the symptoms are far more severe. Constant worry, muscle tension, trouble sleeping, nausea and diarrhea, and other symptoms can make normal daily activities difficult or impossible. These symptoms may occur for no reason, and they can affect your work, school, or social life. Medicines, counseling, and self-care can all help. Follow-up care is a key part of your treatment and safety. Be sure to make and go to all appointments, and call your doctor if you are having problems. It's also a good idea to know your test results and keep a list of the medicines you take. How can you care for yourself at home? · Take medicines exactly as directed. Call your doctor if you think you are having a problem with your medicine. · Go to your counseling sessions and follow-up appointments. · Recognize and accept your anxiety. Then, when you are in a situation that makes you anxious, say to yourself, \"This is not an emergency. I feel uncomfortable, but I am not in danger. I can keep going even if I feel anxious. \"  · Be kind to your body:  ? Relieve tension with exercise or a massage. ? Get enough rest.  ? Avoid alcohol, caffeine, nicotine, and illegal drugs. They can increase your anxiety level and cause sleep problems. ? Learn and do relaxation techniques. See below for more about these techniques. · Engage your mind. Get out and do something you enjoy. Go to a funny movie, or take a walk or hike. Plan your day. Having too much or too little to do can make you anxious. · Keep a record of your symptoms. Discuss your fears with a good friend or family member, or join a support group for people with similar problems.  Talking to others sometimes relieves stress. · Get involved in social groups, or volunteer to help others. Being alone sometimes makes things seem worse than they are. · Get at least 30 minutes of exercise on most days of the week to relieve stress. Walking is a good choice. You also may want to do other activities, such as running, swimming, cycling, or playing tennis or team sports. Relaxation techniques  Do relaxation exercises 10 to 20 minutes a day. You can play soothing, relaxing music while you do them, if you wish. · Tell others in your house that you are going to do your relaxation exercises. Ask them not to disturb you. · Find a comfortable place, away from all distractions and noise. · Lie down on your back, or sit with your back straight. · Focus on your breathing. Make it slow and steady. · Breathe in through your nose. Breathe out through either your nose or mouth. · Breathe deeply, filling up the area between your navel and your rib cage. Breathe so that your belly goes up and down. · Do not hold your breath. · Breathe like this for 5 to 10 minutes. Notice the feeling of calmness throughout your whole body. As you continue to breathe slowly and deeply, relax by doing the following for another 5 to 10 minutes:  · Tighten and relax each muscle group in your body. You can begin at your toes and work your way up to your head. · Imagine your muscle groups relaxing and becoming heavy. · Empty your mind of all thoughts. · Let yourself relax more and more deeply. · Become aware of the state of calmness that surrounds you. · When your relaxation time is over, you can bring yourself back to alertness by moving your fingers and toes and then your hands and feet and then stretching and moving your entire body. Sometimes people fall asleep during relaxation, but they usually wake up shortly afterward.   · Always give yourself time to return to full alertness before you drive a car or do anything that might cause an accident if you are not fully alert. Never play a relaxation tape while you drive a car. When should you call for help? Call 911 anytime you think you may need emergency care. For example, call if:    · You feel you cannot stop from hurting yourself or someone else. Keep the numbers for these national suicide hotlines: 0-700-409-TALK (9-665.949.2867) and 0-167-ONJYGVJ (0-403.506.8044). If you or someone you know talks about suicide or feeling hopeless, get help right away. Watch closely for changes in your health, and be sure to contact your doctor if:    · You have anxiety or fear that affects your life.     · You have symptoms of anxiety that are new or different from those you had before. Where can you learn more? Go to https://BPL GlobalpeWriteLatexeb.SunPods. org and sign in to your [employer] account. Enter P754 in the Adly box to learn more about \"Anxiety Disorder: Care Instructions. \"     If you do not have an account, please click on the \"Sign Up Now\" link. Current as of: September 23, 2020               Content Version: 12.9  © 5493-1187 Healthwise, [employer]. Care instructions adapted under license by [facility] ([facility]). If you have questions about a medical condition or this instruction, always ask your healthcare professional. [address] any warranty or liability for your use of this information.

## 2022-03-15 NOTE — PROGRESS NOTES
Well Adult Note  Name: Jenae Lambert Date: 3/15/2022   MRN: [id number] Sex: Female   Age: 50 y.o. Ethnicity: Non- / Non    : 1975 Race: White (non-)      Sean Stewart is here for well adult exam.  History:    Patient is here for yearly check up    Reports has been doing well at present  Her daughter second grand child   Was anxious but doing well at present  Her oldest son is doing well working etc    Major depressive disorder:  Medication:              Effexor  mg (150 mg and 75 mg) daily  Symptoms:doing fairly well.        B12 deficiency:  Medication:              Vitamin B12 1000 mcg orally daily  Symptoms:some fatigue  Isn't a morning person        Environmental allergies:  Medication:              Zyrtec 10 mg daily  Symptoms:controlled        Insomnia:  Medication:              Melatonin 5 mg nightly  Symptoms: doing well     Eyes:  regularly  Dentist:  regularly  Skin:   No pathologic moles  SBE:  Yes, regularly  Mammo:  Done today at [facility] with health department    Pap:  Done today  Menses regular  Colonoscopy:  No need  Dexa Scan:  No need  Calcium & Vit. D:  No need  MVI:  occasionally  Supplements:  b12 prn  Asa:  no  Labs:  due  Exercise:  She is doing a lot of activities working around the house. Body Image: she would like to lose a little weight, but not actively trying. Diet and Nutr: no special.   Depression:  Controlled on present regimen  Fall:  no  EOL:  No but discussed with spouse. Tobacco:  no  Substance:  no  Immunizations:  Needs tetanus  Sleep good with melatonin  Cognition intact     Health Maintenance: HIV screen, Pap smear, tetanus immunization     Review of Systems   Constitutional: Negative for fatigue and unexpected weight change. HENT: Negative for congestion, ear pain, sinus pressure and sore throat. Eyes: Negative for pain and visual disturbance. Respiratory: Negative for cough, shortness of breath and wheezing.     Cardiovascular: Negative for chest pain, palpitations and leg swelling. Gastrointestinal: Negative for abdominal pain, diarrhea, nausea and vomiting. Endocrine: Negative for polyuria. Genitourinary: Negative for dysuria, frequency, hematuria and urgency. Musculoskeletal: Negative for back pain and neck pain. Skin: Negative for rash. Neurological: Negative for dizziness and headaches. Psychiatric/Behavioral: Negative for self-injury. The patient is not nervous/anxious. Allergies   Allergen Reactions    Percocet [Oxycodone-Acetaminophen] Rash         Prior to Visit Medications    Medication Sig Taking? Authorizing Provider   venlafaxine (EFFEXOR XR) 75 MG extended release capsule Take 1 capsule by mouth daily Take with 150mg Yes NOEL Castaneda   venlafaxine (EFFEXOR XR) 150 MG extended release capsule Take 1 capsule by mouth daily Yes NOEL Castaneda   ezetimibe (ZETIA) 10 MG tablet Take 1 tablet by mouth daily Yes NOEL Castaneda   rosuvastatin (CRESTOR) 20 MG tablet Take 1 tablet by mouth nightly Yes NOEL Castaneda   buPROPion [facility] SR) 150 MG extended release tablet Take 1 tablet by mouth daily Yes NOEL Castaneda   melatonin 5 MG TABS tablet Take 5 mg by mouth daily Yes Historical Provider, MD   vitamin B-12 (CYANOCOBALAMIN) 1000 MCG tablet Take 1,000 mcg by mouth daily Yes Historical Provider, MD   cetirizine (ZYRTEC) 10 MG tablet Take 10 mg by mouth daily Yes Historical Provider, MD         Past Medical History:   Diagnosis Date    Allergic rhinitis        History reviewed. No pertinent surgical history. History reviewed. No pertinent family history.     Social History     Tobacco Use    Smoking status: Never Smoker    Smokeless tobacco: Never Used   Substance Use Topics    Alcohol use: No     Alcohol/week: 0.0 standard drinks    Drug use: No       Objective   /84 (Site: Right Upper Arm, Position: Sitting, Cuff Size: Large Adult)   Pulse 105 Lymphadenopathy:      Cervical: No cervical adenopathy. Skin:     General: Skin is warm and dry. Capillary Refill: Capillary refill takes less than 2 seconds. Findings: No rash. Neurological:      General: No focal deficit present. Mental Status: She is alert and oriented to person, place, and time. Cranial Nerves: No cranial nerve deficit (by gross examination). Sensory: No sensory deficit. Motor: No tremor or atrophy. Gait: Gait normal.      Comments: No focal deficits appreciated   Psychiatric:         Mood and Affect: Mood normal.         Speech: Speech normal.         Behavior: Behavior normal. Behavior is cooperative. Assessment   Plan   1. Encounter for well adult exam without abnormal findings  Discussed preventive  Can do pap here if wants  Will set up mammogram    -     CBC with Auto Differential; Future  -     Comprehensive Metabolic Panel; Future  -     TSH; Future  -     T4, Free; Future  -     Microalbumin / Creatinine Urine Ratio; Future  -     Hemoglobin A1C; Future  -     Lipid Panel; Future  -     Vitamin D 25 Hydroxy; Future  -     Vitamin B12; Future  2. Fatigue, unspecified type  Will start in am  Discussed sleep issues    -     buPROPion (WELLBUTRIN SR) 150 MG extended release tablet; Take 1 tablet by mouth daily, Disp-30 tablet, R-3Normal  3. Recurrent major depressive disorder, in partial remission ([facility] [address]  Doing well  effexor 225mg well    -     venlafaxine (EFFEXOR XR) 75 MG extended release capsule; Take 1 capsule by mouth daily Take with 150mg, Disp-90 capsule, R-1Normal  -     venlafaxine (EFFEXOR XR) 150 MG extended release capsule; Take 1 capsule by mouth daily, Disp-90 capsule, R-1Normal  4. Anxiety  Cont at present  wellbutrin in am  Monitor if worse    -     venlafaxine (EFFEXOR XR) 75 MG extended release capsule;  Take 1 capsule by mouth daily Take with 150mg, Disp-90 capsule, R-1Normal  -     venlafaxine (EFFEXOR XR) 150 MG extended release capsule; Take 1 capsule by mouth daily, Disp-90 capsule, R-1Normal  5. Encounter for screening mammogram for malignant neoplasm of breast  Wants at [facility]   Discussed need to get results  To compare  Will let me know when gets done    -     DAMION DIGITAL SCREEN W OR WO CAD BILATERAL; Future         Personalized Preventive Plan   Current Health Maintenance Status    There is no immunization history on file for this patient.      Health Maintenance   Topic Date Due    Hepatitis C screen  Never done    COVID-19 Vaccine (1) Never done    Depression Monitoring  Never done    HIV screen  Never done    DTaP/Tdap/Td vaccine (1 - Tdap) Never done    Cervical cancer screen  Never done    Colorectal Cancer Screen  Never done    Flu vaccine (1) Never done    Lipid screen  10/27/2021    Hepatitis A vaccine  Aged Out    Hepatitis B vaccine  Aged Out    Hib vaccine  Aged Out    Meningococcal (ACWY) vaccine  Aged Out    Pneumococcal 0-64 years Vaccine  Aged Out     Recommendations for [employer] Due: see orders and patient instructions/AVS.    Return in about 3 months (around 6/15/2022) for woman pap exam .

## 2022-03-22 ENCOUNTER — TELEPHONE (OUTPATIENT)
Dept: PRIMARY CARE CLINIC | Age: 47
End: 2022-03-22

## 2022-03-22 DIAGNOSIS — Z00.00 ENCOUNTER FOR WELL ADULT EXAM WITHOUT ABNORMAL FINDINGS: Primary | ICD-10-CM

## 2022-03-22 DIAGNOSIS — E78.2 MIXED HYPERLIPIDEMIA: Primary | ICD-10-CM

## 2022-03-22 LAB
ALBUMIN SERPL-MCNC: 3.7 G/DL
ALP BLD-CCNC: 70 U/L
ALT SERPL-CCNC: 23 U/L
ANION GAP SERPL CALCULATED.3IONS-SCNC: NORMAL MMOL/L
AST SERPL-CCNC: 17 U/L
AVERAGE GLUCOSE: NORMAL
BASOPHILS ABSOLUTE: 0.02 /ΜL
BASOPHILS RELATIVE PERCENT: 0.3 %
BILIRUB SERPL-MCNC: 0.36 MG/DL (ref 0.1–1.4)
BUN BLDV-MCNC: 11 MG/DL
CALCIUM SERPL-MCNC: 8.7 MG/DL
CHLORIDE BLD-SCNC: 104 MMOL/L
CHOLESTEROL, TOTAL: 261 MG/DL
CHOLESTEROL/HDL RATIO: ABNORMAL
CO2: 28 MMOL/L
CREAT SERPL-MCNC: 0.78 MG/DL
CREATININE, URINE: 79.1
EOSINOPHILS ABSOLUTE: 0.15 /ΜL
EOSINOPHILS RELATIVE PERCENT: 2.4 %
GFR CALCULATED: >=60
GLUCOSE BLD-MCNC: 88 MG/DL
HBA1C MFR BLD: 5.3 %
HCT VFR BLD CALC: 44.5 % (ref 36–46)
HDLC SERPL-MCNC: 69 MG/DL (ref 35–70)
HEMOGLOBIN: 14.8 G/DL (ref 12–16)
LDL CHOLESTEROL CALCULATED: 179 MG/DL (ref 0–160)
LYMPHOCYTES ABSOLUTE: 1.95 /ΜL
LYMPHOCYTES RELATIVE PERCENT: 31 %
MCH RBC QN AUTO: 31.8 PG
MCHC RBC AUTO-ENTMCNC: 33.3 G/DL
MCV RBC AUTO: 95.5 FL
MICROALBUMIN/CREAT 24H UR: 5 MG/G{CREAT}
MICROALBUMIN/CREAT UR-RTO: 6.3
MONOCYTES ABSOLUTE: 0.4 /ΜL
MONOCYTES RELATIVE PERCENT: 6.3 %
NEUTROPHILS ABSOLUTE: 3.77 /ΜL
NEUTROPHILS RELATIVE PERCENT: 59.8 %
NONHDLC SERPL-MCNC: ABNORMAL MG/DL
PDW BLD-RTO: 11.9 %
PLATELET # BLD: 325 K/ΜL
PMV BLD AUTO: 10.1 FL
POTASSIUM SERPL-SCNC: 4 MMOL/L
RBC # BLD: 4.66 10^6/ΜL
SODIUM BLD-SCNC: 141 MMOL/L
TOTAL PROTEIN: 7.2
TRIGL SERPL-MCNC: 63 MG/DL
VLDLC SERPL CALC-MCNC: ABNORMAL MG/DL
WBC # BLD: 6.3 10^3/ML

## 2022-03-22 NOTE — TELEPHONE ENCOUNTER
----- Message from NOEL Davis sent at 3/22/2022 11:07 AM CDT -----  Lipids   Crestor 10mg 1 po qpm #30 11rf  Recheck in 6 weeks alt and lipids  Urine good   Cont present  A1c normal glucose control  Cmp electrolytes liver and kidneys wnl  CBC normal no anemia or concerns

## 2022-03-24 ENCOUNTER — TELEPHONE (OUTPATIENT)
Dept: PRIMARY CARE CLINIC | Age: 47
End: 2022-03-24

## 2022-03-24 DIAGNOSIS — E55.9 VITAMIN D DEFICIENCY: Primary | ICD-10-CM

## 2022-03-24 DIAGNOSIS — Z00.00 ENCOUNTER FOR WELL ADULT EXAM WITHOUT ABNORMAL FINDINGS: Primary | ICD-10-CM

## 2022-03-24 DIAGNOSIS — R53.83 FATIGUE, UNSPECIFIED TYPE: ICD-10-CM

## 2022-03-24 LAB
T4 FREE: 1.04
TSH SERPL DL<=0.05 MIU/L-ACNC: 1.67 UIU/ML
VITAMIN B-12: 502
VITAMIN D 25-HYDROXY: 22
VITAMIN D2, 25 HYDROXY: ABNORMAL
VITAMIN D3,25 HYDROXY: ABNORMAL

## 2022-03-24 NOTE — TELEPHONE ENCOUNTER
Called patient, spoke with: Patient regarding the results of the patients most recent results. I advised Patient of Benjie León recommendations.    Patient did voice understanding      She said that she already has crestor that was sent in last week

## 2022-03-24 NOTE — TELEPHONE ENCOUNTER
----- Message from NOEL Gustafson sent at 3/24/2022  8:19 AM CDT -----  Your vitamin d level is low  Suggest start vitamin d 76336 units 1 po weekly for 8 weeks #4 1 refill  Then recheck level  Thyroid wnl  B12 wnl

## 2022-03-25 RX ORDER — ERGOCALCIFEROL 1.25 MG/1
50000 CAPSULE ORAL WEEKLY
Qty: 4 CAPSULE | Refills: 1 | Status: SHIPPED | OUTPATIENT
Start: 2022-03-25

## 2022-04-12 ENCOUNTER — TELEPHONE (OUTPATIENT)
Dept: PRIMARY CARE CLINIC | Age: 47
End: 2022-04-12

## 2022-04-12 DIAGNOSIS — N63.0 BREAST NODULE: Primary | ICD-10-CM

## 2022-04-13 NOTE — TELEPHONE ENCOUNTER
----- Message from NOEL Moore sent at 4/11/2022 11:59 AM CDT -----  Please call patient mammogram incomplete  As need left breast US to evaluate area  Please schedule and order at [facility] per patient request  And make sure she knows to let us know she had it done

## 2022-05-06 ENCOUNTER — TELEPHONE (OUTPATIENT)
Dept: PRIMARY CARE CLINIC | Age: 47
End: 2022-05-06

## 2022-05-06 DIAGNOSIS — N63.0 BREAST NODULE: ICD-10-CM

## 2022-05-06 NOTE — TELEPHONE ENCOUNTER
Called patient, spoke with: Patient regarding the results of the patients most recent US Breast.  I advised Patient of Dwaine Funes recommendations.    Patient did voice understanding

## 2022-05-06 NOTE — TELEPHONE ENCOUNTER
----- Message from NOEL Hassan sent at 5/6/2022 11:02 AM CDT -----  US breast benign   Cont to monitor with monthly SBE  Reheck mammo in 1 year or changes in SBE

## 2022-06-16 ENCOUNTER — OFFICE VISIT (OUTPATIENT)
Dept: PRIMARY CARE CLINIC | Age: 47
End: 2022-06-16
Payer: COMMERCIAL

## 2022-06-16 VITALS
TEMPERATURE: 98.3 F | WEIGHT: 147.5 LBS | BODY MASS INDEX: 24.55 KG/M2 | OXYGEN SATURATION: 98 % | SYSTOLIC BLOOD PRESSURE: 122 MMHG | HEART RATE: 92 BPM | DIASTOLIC BLOOD PRESSURE: 86 MMHG

## 2022-06-16 DIAGNOSIS — Z12.11 SCREEN FOR COLON CANCER: ICD-10-CM

## 2022-06-16 DIAGNOSIS — Z00.00 ROUTINE HISTORY AND PHYSICAL EXAMINATION OF ADULT: Primary | ICD-10-CM

## 2022-06-16 PROCEDURE — S0613 ANN BREAST EXAM: HCPCS | Performed by: NURSE PRACTITIONER

## 2022-06-16 PROCEDURE — 99396 PREV VISIT EST AGE 40-64: CPT | Performed by: NURSE PRACTITIONER

## 2022-06-16 NOTE — PATIENT INSTRUCTIONS
Patient Education        Well Visit, Ages 25 to 48: Care Instructions  Overview     Well visits can help you stay healthy. Your doctor has checked your overall health and may have suggested ways to take good care of yourself. Your doctor also may have recommended tests. At home, you can help prevent illness withhealthy eating, regular exercise, and other steps. Follow-up care is a key part of your treatment and safety. Be sure to make and go to all appointments, and call your doctor if you are having problems. It's also a good idea to know your test results and keep alist of the medicines you take. How can you care for yourself at home?  Get screening tests that you and your doctor decide on. Screening helps find diseases before any symptoms appear.  Eat healthy foods. Choose fruits, vegetables, whole grains, protein, and low-fat dairy foods. Limit fat, especially saturated fat. Reduce salt in your diet.  Limit alcohol. If you are a man, have no more than 2 drinks a day or 14 drinks a week. If you are a woman, have no more than 1 drink a day or 7 drinks a week.  Get at least 30 minutes of physical activity on most days of the week. Walking is a good choice. You also may want to do other activities, such as running, swimming, cycling, or playing tennis or team sports. Discuss any changes in your exercise program with your doctor.  Reach and stay at a healthy weight. This will lower your risk for many problems, such as obesity, diabetes, heart disease, and high blood pressure.  Do not smoke or allow others to smoke around you. If you need help quitting, talk to your doctor about stop-smoking programs and medicines. These can increase your chances of quitting for good.  Care for your mental health. It is easy to get weighed down by worry and stress. Learn strategies to manage stress, like deep breathing and mindfulness, and stay connected with your family and community.  If you find you often feel sad or hopeless, talk with your doctor. Treatment can help.  Talk to your doctor about whether you have any risk factors for sexually transmitted infections (STIs). You can help prevent STIs if you wait to have sex with a new partner (or partners) until you've each been tested for STIs. It also helps if you use condoms (male or female condoms) and if you limit your sex partners to one person who only has sex with you. Vaccines are available for some STIs, such as HPV.  Use birth control if it's important to you to prevent pregnancy. Talk with your doctor about the choices available and what might be best for you.  If you think you may have a problem with alcohol or drug use, talk to your doctor. This includes prescription medicines (such as amphetamines and opioids) and illegal drugs (such as cocaine and methamphetamine). Your doctor can help you figure out what type of treatment is best for you.  Protect your skin from too much sun. When you're outdoors from 10 a.m. to 4 p.m., stay in the shade or cover up with clothing and a hat with a wide brim. Wear sunglasses that block UV rays. Even when it's cloudy, put broad-spectrum sunscreen (SPF 30 or higher) on any exposed skin.  See a dentist one or two times a year for checkups and to have your teeth cleaned.  Wear a seat belt in the car. When should you call for help? Watch closely for changes in your health, and be sure to contact your doctor if you have any problems or symptoms that concern you. Where can you learn more? Go to https://Haodf.comkerry.healthSportsCrunch. org and sign in to your [employer] account. Enter P072 in the [facility] box to learn more about \"Well Visit, Ages 25 to 48: Care Instructions. \"     If you do not have an account, please click on the \"Sign Up Now\" link. Current as of: October 6, 2021               Content Version: 13.2  © 9186-1595 Healthwise, Inc[employer]. Care instructions adapted under license by [facility] ([facility]). If you have questions about a medical condition or this instruction, always ask your healthcare professional. [address] any warranty or liability for your use of this information. Patient Education        Pap Test: Care Instructions  Overview     The Pap test (also called a Pap smear) is a screening test for cancer of the cervix, which is the lower part of the uterus that opens into the vagina. The test can help your doctor find early changes in the cells that could lead tocancer. The sample of cells taken during your test has been sent to a lab so that an expert can look at the cells. It usually takes a week or two to get the resultsback. Follow-up care is a key part of your treatment and safety. Be sure to make and go to all appointments, and call your doctor if you are having problems. It's also a good idea to know your test results and keep alist of the medicines you take. What do the results mean?  A normal result means that the test did not find any abnormal cells in the sample.  An abnormal result can mean many things. Most of these are not cancer. The results of your test may be abnormal because:  ? You have an infection of the vagina or cervix, such as a yeast infection. ? You have an IUD (intrauterine device for birth control). ? You have low estrogen levels after menopause that are causing the cells to change. ? You have cell changes that may be a sign of precancer or cancer. The results are ranked based on how serious the changes might be. There are many other reasons why you might not get a normal result. If the results were abnormal, you may need to get another test within a few weeks or months. If the results show changes that could be a sign of cancer, you may need a test called a colposcopy, which provides a more complete view of thecervix. Sometimes the lab cannot use the sample because it does not contain enough cells or was not preserved well.  If so, you may need to have the test again. This is not common, but it does happen from time to time. When should you call for help? Watch closely for changes in your health, and be sure to contact your doctor if:     You have vaginal bleeding or pain for more than 2 days after the test. It is normal to have a small amount of bleeding for a day or two after the test.   Where can you learn more? Go to https://Extreme DApepiceweb.healthAdaptive O[employer] Solutions. org and sign in to your [employer] account. Enter W372 in the [employer] box to learn more about \"Pap Test: Care Instructions. \"     If you do not have an account, please click on the \"Sign Up Now\" link. Current as of: September 8, 2021               Content Version: 13.2  © 2040-6697 Healthwise, Incorporated. Care instructions adapted under license by [facility] ([facility]). If you have questions about a medical condition or this instruction, always ask your healthcare professional. [place] [address] any warranty or liability for your use of this information.
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
## 2022-06-23 LAB
HPV COMMENT: NORMAL
HPV TYPE 16: NOT DETECTED
HPV TYPE 18: NOT DETECTED
HPVOH (OTHER TYPES): NOT DETECTED

## 2022-07-01 DIAGNOSIS — R53.83 FATIGUE, UNSPECIFIED TYPE: ICD-10-CM

## 2022-07-01 RX ORDER — ROSUVASTATIN CALCIUM 20 MG/1
20 TABLET, COATED ORAL NIGHTLY
Qty: 90 TABLET | Refills: 3 | Status: SHIPPED | OUTPATIENT
Start: 2022-07-01

## 2022-07-01 RX ORDER — BUPROPION HYDROCHLORIDE 150 MG/1
150 TABLET, EXTENDED RELEASE ORAL DAILY
Qty: 90 TABLET | Refills: 3 | Status: SHIPPED | OUTPATIENT
Start: 2022-07-01

## 2022-07-01 NOTE — TELEPHONE ENCOUNTER
Received fax from pharmacy requesting refill on pts medication(s). Pt was last seen in office on 6/16/2022  and has a follow up scheduled for Visit date not found. Will send request to  Jaymie Vences  for patient.      Requested Prescriptions     Pending Prescriptions Disp Refills    buPROPion (WELLBUTRIN SR) 150 MG extended release tablet 30 tablet 3     Sig: Take 1 tablet by mouth daily

## 2022-07-14 NOTE — TELEPHONE ENCOUNTER
Call patient with lab result-x-ray normal   Keep follow-up appointment July 19  Called patient, spoke with: Patient regarding the results of the patients most recent mammogram.  I advised Patient of Eulalia Saldaña recommendations.    Patient did voice understanding

## 2022-09-07 ENCOUNTER — TELEPHONE (OUTPATIENT)
Dept: PRIMARY CARE CLINIC | Age: 47
End: 2022-09-07

## 2022-09-07 DIAGNOSIS — Z12.11 ENCOUNTER FOR SCREENING COLONOSCOPY: Primary | ICD-10-CM

## 2022-09-07 NOTE — TELEPHONE ENCOUNTER
Patient called in stating that she is due for a colonoscopy screening. If ok she would like to have it done at [facility].

## 2022-09-07 NOTE — TELEPHONE ENCOUNTER
It appears juli ordered cologuard at visit in June. Does she want colonoscopy or cologuard? If she wants cologuard she should have obtained kit with order from visit in June. If not please ensure she gets the kit.

## 2022-09-20 ENCOUNTER — OFFICE VISIT (OUTPATIENT)
Dept: PRIMARY CARE CLINIC | Age: 47
End: 2022-09-20
Payer: COMMERCIAL

## 2022-09-20 VITALS
WEIGHT: 145.75 LBS | HEART RATE: 113 BPM | DIASTOLIC BLOOD PRESSURE: 80 MMHG | BODY MASS INDEX: 24.28 KG/M2 | TEMPERATURE: 98.1 F | SYSTOLIC BLOOD PRESSURE: 127 MMHG | OXYGEN SATURATION: 98 % | HEIGHT: 65 IN

## 2022-09-20 DIAGNOSIS — R41.82 ALTERED MENTAL STATUS, UNSPECIFIED ALTERED MENTAL STATUS TYPE: ICD-10-CM

## 2022-09-20 DIAGNOSIS — I95.9 HYPOTENSIVE EPISODE: Primary | ICD-10-CM

## 2022-09-20 DIAGNOSIS — R00.0 TACHYCARDIA: ICD-10-CM

## 2022-09-20 PROCEDURE — 99214 OFFICE O/P EST MOD 30 MIN: CPT | Performed by: NURSE PRACTITIONER

## 2022-09-20 SDOH — ECONOMIC STABILITY: FOOD INSECURITY: WITHIN THE PAST 12 MONTHS, THE FOOD YOU BOUGHT JUST DIDN'T LAST AND YOU DIDN'T HAVE MONEY TO GET MORE.: NEVER TRUE

## 2022-09-20 SDOH — ECONOMIC STABILITY: FOOD INSECURITY: WITHIN THE PAST 12 MONTHS, YOU WORRIED THAT YOUR FOOD WOULD RUN OUT BEFORE YOU GOT MONEY TO BUY MORE.: NEVER TRUE

## 2022-09-20 ASSESSMENT — ENCOUNTER SYMPTOMS
COUGH: 0
NAUSEA: 0
ABDOMINAL PAIN: 0
SHORTNESS OF BREATH: 0
WHEEZING: 0
SORE THROAT: 0
BACK PAIN: 0
DIARRHEA: 0
SINUS PRESSURE: 0
EYE PAIN: 0
VOMITING: 0

## 2022-09-20 ASSESSMENT — SOCIAL DETERMINANTS OF HEALTH (SDOH): HOW HARD IS IT FOR YOU TO PAY FOR THE VERY BASICS LIKE FOOD, HOUSING, MEDICAL CARE, AND HEATING?: NOT HARD AT ALL
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
## 2022-09-21 ENCOUNTER — TELEPHONE (OUTPATIENT)
Dept: PSYCHIATRY | Age: 47
End: 2022-09-21

## 2022-09-26 ENCOUNTER — TELEPHONE (OUTPATIENT)
Dept: PRIMARY CARE CLINIC | Age: 47
End: 2022-09-26

## 2022-09-26 DIAGNOSIS — I95.9 HYPOTENSIVE EPISODE: ICD-10-CM

## 2022-09-26 DIAGNOSIS — R00.0 TACHYCARDIA: ICD-10-CM

## 2022-09-26 DIAGNOSIS — R41.82 ALTERED MENTAL STATUS, UNSPECIFIED ALTERED MENTAL STATUS TYPE: ICD-10-CM

## 2022-09-26 NOTE — TELEPHONE ENCOUNTER
----- Message from [address] [facility],[address], DO sent at 9/26/2022  3:34 PM CDT -----  CT scan of the head is normal.

## 2022-09-27 ENCOUNTER — TELEPHONE (OUTPATIENT)
Dept: PSYCHIATRY | Age: 47
End: 2022-09-27

## 2022-09-27 NOTE — TELEPHONE ENCOUNTER
Called pt to schedule an appt from a referral    No answer and LVM.     Electronically signed by Liliam Patrick MA on 9/27/2022 at 12:28 PM

## 2022-09-28 ENCOUNTER — TELEPHONE (OUTPATIENT)
Dept: PRIMARY CARE CLINIC | Age: 47
End: 2022-09-28

## 2022-09-28 DIAGNOSIS — I95.9 HYPOTENSIVE EPISODE: ICD-10-CM

## 2022-09-28 DIAGNOSIS — Z00.00 ENCOUNTER FOR WELL ADULT EXAM WITHOUT ABNORMAL FINDINGS: ICD-10-CM

## 2022-09-28 DIAGNOSIS — Z12.11 ENCOUNTER FOR SCREENING COLONOSCOPY: Primary | ICD-10-CM

## 2022-09-28 DIAGNOSIS — R41.82 ALTERED MENTAL STATUS, UNSPECIFIED ALTERED MENTAL STATUS TYPE: ICD-10-CM

## 2022-09-28 DIAGNOSIS — R00.0 TACHYCARDIA: ICD-10-CM

## 2022-09-28 LAB
ALBUMIN SERPL-MCNC: 3.9 G/DL
ALP BLD-CCNC: 69 U/L
ALT SERPL-CCNC: 26 U/L
ANION GAP SERPL CALCULATED.3IONS-SCNC: ABNORMAL MMOL/L
AST SERPL-CCNC: 14 U/L
AVERAGE GLUCOSE: NORMAL
BASOPHILS ABSOLUTE: 0.02 /ΜL
BASOPHILS RELATIVE PERCENT: 0.3 %
BILIRUB SERPL-MCNC: 0.4 MG/DL (ref 0.1–1.4)
BUN BLDV-MCNC: 14 MG/DL
CALCIUM SERPL-MCNC: 8.8 MG/DL
CHLORIDE BLD-SCNC: 102 MMOL/L
CO2: 29.5 MMOL/L
CREAT SERPL-MCNC: 0.88 MG/DL
CREATININE, URINE: 104.8
EOSINOPHILS ABSOLUTE: 0.13 /ΜL
EOSINOPHILS RELATIVE PERCENT: 2.1 %
GFR CALCULATED: >=60
GLUCOSE BLD-MCNC: 79 MG/DL
HBA1C MFR BLD: 5.4 %
HCT VFR BLD CALC: 46 % (ref 36–46)
HEMOGLOBIN: 14.8 G/DL (ref 12–16)
LYMPHOCYTES ABSOLUTE: 1.93 /ΜL
LYMPHOCYTES RELATIVE PERCENT: 30.6 %
MCH RBC QN AUTO: 30.5 PG
MCHC RBC AUTO-ENTMCNC: 32.2 G/DL
MCV RBC AUTO: 94.8 FL
MICROALBUMIN/CREAT 24H UR: 10.4 MG/G{CREAT}
MICROALBUMIN/CREAT UR-RTO: 9.9
MONOCYTES ABSOLUTE: 0.5 /ΜL
MONOCYTES RELATIVE PERCENT: 7.9 %
NEUTROPHILS ABSOLUTE: 3.71 /ΜL
NEUTROPHILS RELATIVE PERCENT: 58.8 %
PDW BLD-RTO: 11.7 %
PLATELET # BLD: 290 K/ΜL
PMV BLD AUTO: 10.1 FL
POTASSIUM SERPL-SCNC: 3.9 MMOL/L
RBC # BLD: 4.85 10^6/ΜL
SODIUM BLD-SCNC: 138 MMOL/L
TOTAL PROTEIN: 7.3
TSH SERPL DL<=0.05 MIU/L-ACNC: 1.71 UIU/ML
VITAMIN D 25-HYDROXY: 38.6
VITAMIN D2, 25 HYDROXY: NORMAL
VITAMIN D3,25 HYDROXY: NORMAL
WBC # BLD: 6.3 10^3/ML

## 2022-09-28 NOTE — TELEPHONE ENCOUNTER
----- Message from NOEL Humphries sent at 9/28/2022  1:16 PM CDT -----  Thyroid wnl  Vitamin d normal

## 2022-09-28 NOTE — TELEPHONE ENCOUNTER
----- Message from NOEL Chappell sent at 9/28/2022  1:55 PM CDT -----  CBC normal no anemia or concerns

## 2022-09-28 NOTE — TELEPHONE ENCOUNTER
----- Message from NOEL Muñoz sent at 9/28/2022  1:20 PM CDT -----  A1c normal glucose control  Urine good no abnormal protein in urine

## 2022-09-28 NOTE — TELEPHONE ENCOUNTER
----- Message from NOEL Mcnally sent at 9/28/2022  1:30 PM CDT -----  Cmp electrolytes liver and kidneys wnl

## 2022-10-04 ENCOUNTER — TELEPHONE (OUTPATIENT)
Dept: PSYCHIATRY | Age: 47
End: 2022-10-04

## 2022-10-04 NOTE — TELEPHONE ENCOUNTER
Called pt to schedule an appt    No answer and LVM. This is the third time trying to reach pt. Will be sending back to referring office.     Electronically signed by Varun Grubbs MA on 10/4/2022 at 12:39 PM

## 2022-10-10 ENCOUNTER — TELEPHONE (OUTPATIENT)
Dept: PRIMARY CARE CLINIC | Age: 47
End: 2022-10-10

## 2022-10-10 DIAGNOSIS — R00.0 TACHYCARDIA: ICD-10-CM

## 2022-10-10 DIAGNOSIS — F33.41 RECURRENT MAJOR DEPRESSIVE DISORDER, IN PARTIAL REMISSION (HCC): Primary | ICD-10-CM

## 2022-10-10 DIAGNOSIS — R41.82 ALTERED MENTAL STATUS, UNSPECIFIED ALTERED MENTAL STATUS TYPE: ICD-10-CM

## 2022-10-10 DIAGNOSIS — I95.9 HYPOTENSIVE EPISODE: ICD-10-CM

## 2022-10-10 NOTE — TELEPHONE ENCOUNTER
----- Message from NOEL Banks sent at 10/10/2022 10:47 AM CDT -----  Normal alpha gal negative  Per reference tryptase normal at present for [facility]  Suggest rechecking during \"hypotensive times \"

## 2022-11-23 ENCOUNTER — TELEPHONE (OUTPATIENT)
Dept: PRIMARY CARE CLINIC | Age: 47
End: 2022-11-23

## 2022-11-29 NOTE — TELEPHONE ENCOUNTER
Patient messaged about constipation and some right ovarian pain  At times also no left side   Advised to make apt for US and further evaluation

## 2022-12-01 ENCOUNTER — HOSPITAL ENCOUNTER (OUTPATIENT)
Dept: GENERAL RADIOLOGY | Age: 47
Discharge: HOME | End: 2022-12-01
Payer: COMMERCIAL

## 2022-12-01 ENCOUNTER — OFFICE VISIT (OUTPATIENT)
Dept: PRIMARY CARE CLINIC | Age: 47
End: 2022-12-01
Payer: COMMERCIAL

## 2022-12-01 VITALS
BODY MASS INDEX: 24.67 KG/M2 | DIASTOLIC BLOOD PRESSURE: 80 MMHG | OXYGEN SATURATION: 98 % | TEMPERATURE: 98 F | HEART RATE: 95 BPM | WEIGHT: 148.25 LBS | SYSTOLIC BLOOD PRESSURE: 120 MMHG

## 2022-12-01 DIAGNOSIS — R10.2 PELVIC PAIN: ICD-10-CM

## 2022-12-01 DIAGNOSIS — K59.00 CONSTIPATION, UNSPECIFIED CONSTIPATION TYPE: ICD-10-CM

## 2022-12-01 DIAGNOSIS — K58.1 IRRITABLE BOWEL SYNDROME WITH CONSTIPATION: ICD-10-CM

## 2022-12-01 DIAGNOSIS — N83.202 CYST OF LEFT OVARY: Primary | ICD-10-CM

## 2022-12-01 DIAGNOSIS — N83.202 CYST OF LEFT OVARY: ICD-10-CM

## 2022-12-01 PROCEDURE — 76830 TRANSVAGINAL US NON-OB: CPT

## 2022-12-01 PROCEDURE — 99214 OFFICE O/P EST MOD 30 MIN: CPT | Performed by: NURSE PRACTITIONER

## 2022-12-01 ASSESSMENT — ENCOUNTER SYMPTOMS
ABDOMINAL PAIN: 1
VOMITING: 0
CONSTIPATION: 1
COUGH: 0
BACK PAIN: 0
NAUSEA: 0

## 2022-12-01 NOTE — PROGRESS NOTES
Noemy Jim (:  1975) is a 50 y.o. female,Established patient, here for evaluation of the following chief complaint(s):  Abdominal Pain (Started really bothering her last week)      ASSESSMENT/PLAN:    ICD-10-CM    1. Cyst of left ovary  N83.202 US NON OB TRANSVAGINAL     External Referral To Gynecology  Due to insurance        2. Pelvic pain  R10.2 US NON OB TRANSVAGINAL     External Referral To Gynecology      3. Constipation, unspecified constipation type  K59.00 Will trial linzess  Failed miralax  With increased gas  Failed stool softners        4. Irritable bowel syndrome with constipation  K58.1 linaclotide (LINZESS) 145 MCG capsule          Return in about 1 month (around 2023) for constipation and pain. SUBJECTIVE/OBJECTIVE:  HPI  Patient is here for some GI issues  Reports she has been having issues with constipation   Cant go without assistance  Reports that had a colonscopy last month    She reports it was negative Dr Mercedes Ahumada office    Reports has been taking miralax  But at times no relief  She notes the constipation is severe  Once since last week  Reports the constipation a month ago   In turn gas is getting worse      She has regular periods  But she notes her mother had ovarian cancer  Notes pain left ovarian pain   On one spot       /80 (Site: Right Upper Arm, Position: Sitting, Cuff Size: Large Adult)   Pulse 95   Temp 98 °F (36.7 °C) (Temporal)   Wt 148 lb 4 oz (67.2 kg)   SpO2 98%   BMI 24.67 kg/m²   Review of Systems   Constitutional:  Positive for activity change and appetite change. Negative for fatigue and fever. HENT:  Negative for congestion. Respiratory:  Negative for cough. Cardiovascular:  Negative for chest pain, palpitations and leg swelling. Gastrointestinal:  Positive for abdominal pain (with gas) and constipation. Negative for nausea and vomiting. Genitourinary:  Positive for menstrual problem and pelvic pain (left ovary).  Negative for difficulty urinating. Musculoskeletal:  Negative for arthralgias and back pain. Psychiatric/Behavioral:  Negative for behavioral problems and sleep disturbance. The patient is not nervous/anxious and is not hyperactive. Physical Exam  Vitals reviewed. Constitutional:       General: She is not in acute distress. Appearance: Normal appearance. She is not ill-appearing. Cardiovascular:      Rate and Rhythm: Normal rate and regular rhythm. Heart sounds: No murmur heard. Pulmonary:      Effort: Pulmonary effort is normal.      Breath sounds: Normal breath sounds. Abdominal:      General: Bowel sounds are normal.      Tenderness: There is abdominal tenderness (left lower quad). Skin:     Findings: No rash. Neurological:      General: No focal deficit present. Mental Status: She is alert and oriented to person, place, and time. Psychiatric:         Mood and Affect: Mood normal.         Behavior: Behavior normal.                 An electronic signature was used to authenticate this note.     --NOEL Gomez

## 2022-12-02 ENCOUNTER — TELEPHONE (OUTPATIENT)
Dept: PRIMARY CARE CLINIC | Age: 47
End: 2022-12-02

## 2022-12-02 NOTE — TELEPHONE ENCOUNTER
Called patient, spoke with: Patient regarding the results of the patients most recent US. I advised Patient of Arelis Chaudhry recommendations.    Patient did voice understanding

## 2022-12-02 NOTE — TELEPHONE ENCOUNTER
----- Message from NOEL Pratt sent at 12/2/2022  7:58 AM CST -----  US pelvis noted some leomyomas   Right ovary normal  Left ovary not visualized  Can discuss with Dr Natalya Carreno at apt

## 2023-03-16 ENCOUNTER — OFFICE VISIT (OUTPATIENT)
Dept: FAMILY MEDICINE CLINIC | Age: 48
End: 2023-03-16
Payer: COMMERCIAL

## 2023-03-16 VITALS
TEMPERATURE: 97.9 F | BODY MASS INDEX: 24.46 KG/M2 | WEIGHT: 147 LBS | SYSTOLIC BLOOD PRESSURE: 112 MMHG | HEART RATE: 86 BPM | OXYGEN SATURATION: 98 % | DIASTOLIC BLOOD PRESSURE: 76 MMHG

## 2023-03-16 DIAGNOSIS — F33.41 RECURRENT MAJOR DEPRESSIVE DISORDER, IN PARTIAL REMISSION (HCC): ICD-10-CM

## 2023-03-16 DIAGNOSIS — E55.9 VITAMIN D DEFICIENCY: ICD-10-CM

## 2023-03-16 DIAGNOSIS — R53.83 FATIGUE, UNSPECIFIED TYPE: ICD-10-CM

## 2023-03-16 DIAGNOSIS — F41.9 ANXIETY: ICD-10-CM

## 2023-03-16 DIAGNOSIS — E78.2 MIXED HYPERLIPIDEMIA: ICD-10-CM

## 2023-03-16 DIAGNOSIS — Z00.00 WELL ADULT EXAM: Primary | ICD-10-CM

## 2023-03-16 PROCEDURE — 99396 PREV VISIT EST AGE 40-64: CPT | Performed by: NURSE PRACTITIONER

## 2023-03-16 RX ORDER — EZETIMIBE 10 MG/1
10 TABLET ORAL DAILY
Qty: 30 TABLET | Refills: 5 | Status: SHIPPED | OUTPATIENT
Start: 2023-03-16

## 2023-03-16 RX ORDER — VENLAFAXINE HYDROCHLORIDE 150 MG/1
150 CAPSULE, EXTENDED RELEASE ORAL DAILY
Qty: 90 CAPSULE | Refills: 3 | Status: SHIPPED | OUTPATIENT
Start: 2023-03-16

## 2023-03-16 RX ORDER — BUPROPION HYDROCHLORIDE 150 MG/1
150 TABLET, EXTENDED RELEASE ORAL DAILY
Qty: 90 TABLET | Refills: 3 | Status: SHIPPED | OUTPATIENT
Start: 2023-03-16

## 2023-03-16 RX ORDER — VENLAFAXINE HYDROCHLORIDE 75 MG/1
75 CAPSULE, EXTENDED RELEASE ORAL DAILY
Qty: 90 CAPSULE | Refills: 3 | Status: SHIPPED | OUTPATIENT
Start: 2023-03-16

## 2023-03-16 RX ORDER — ROSUVASTATIN CALCIUM 20 MG/1
20 TABLET, COATED ORAL NIGHTLY
Qty: 90 TABLET | Refills: 3 | Status: SHIPPED | OUTPATIENT
Start: 2023-03-16

## 2023-03-16 ASSESSMENT — PATIENT HEALTH QUESTIONNAIRE - PHQ9
4. FEELING TIRED OR HAVING LITTLE ENERGY: 0
8. MOVING OR SPEAKING SO SLOWLY THAT OTHER PEOPLE COULD HAVE NOTICED. OR THE OPPOSITE, BEING SO FIGETY OR RESTLESS THAT YOU HAVE BEEN MOVING AROUND A LOT MORE THAN USUAL: 0
5. POOR APPETITE OR OVEREATING: 0
2. FEELING DOWN, DEPRESSED OR HOPELESS: 1
6. FEELING BAD ABOUT YOURSELF - OR THAT YOU ARE A FAILURE OR HAVE LET YOURSELF OR YOUR FAMILY DOWN: 0
9. THOUGHTS THAT YOU WOULD BE BETTER OFF DEAD, OR OF HURTING YOURSELF: 0
1. LITTLE INTEREST OR PLEASURE IN DOING THINGS: 0
SUM OF ALL RESPONSES TO PHQ9 QUESTIONS 1 & 2: 1
10. IF YOU CHECKED OFF ANY PROBLEMS, HOW DIFFICULT HAVE THESE PROBLEMS MADE IT FOR YOU TO DO YOUR WORK, TAKE CARE OF THINGS AT HOME, OR GET ALONG WITH OTHER PEOPLE: 0
7. TROUBLE CONCENTRATING ON THINGS, SUCH AS READING THE NEWSPAPER OR WATCHING TELEVISION: 0
SUM OF ALL RESPONSES TO PHQ QUESTIONS 1-9: 1
3. TROUBLE FALLING OR STAYING ASLEEP: 0
SUM OF ALL RESPONSES TO PHQ QUESTIONS 1-9: 1

## 2023-03-16 ASSESSMENT — ENCOUNTER SYMPTOMS
SHORTNESS OF BREATH: 0
VOMITING: 0
ABDOMINAL PAIN: 0
BACK PAIN: 0
COUGH: 0
WHEEZING: 0
SINUS PRESSURE: 0
DIARRHEA: 0
EYE PAIN: 0
NAUSEA: 0
SORE THROAT: 0

## 2023-03-16 NOTE — PROGRESS NOTES
3/16/2023    Liz Kirby (:  1975) is a 46 y.o. female, here for a preventive medicine evaluation. Patient is here for yearly check up  Major depressive disorder:  Medication:              Effexor  mg (150 mg and 75 mg) daily  Along with wellbutrin 150mg in the am   Symptoms:doing fairly well. B12 deficiency:  Medication:              Vitamin B12 1000 mcg orally daily  Symptoms:some fatigue  Isn't a morning person        Environmental allergies:  Medication:              Zyrtec 10 mg daily  Symptoms:controlled        Insomnia:  Medication:              Melatonin 5 mg nightly  Symptoms: doing well    Vitamin d deficiency   Last check 2022 low of normal   Due to check again     Eyes:  regularly  Dentist:  regularly  Skin:   No pathologic moles  SBE:  Yes, regularly  Mammo:  Done today at [facility] with health department    Pap:  Done today  Menses regular  Colonoscopy:  Dr Rivas Gut office ( normal) with foreigner reports about october  Dexa Scan:  No need  Calcium & Vit. D:  No need  MVI:  occasionally  Supplements:  b12 prn  Asa:  no  Labs:  due  Exercise:  She is doing a lot of activities working around the house and working  [employer]: denies  Diet and Nutr: no special.   Depression:  Controlled on present regimen  Fall:  no  EOL:  No but discussed with spouse. Tobacco:  no  Substance:  no  Immunizations:  Needs tetanus  Sleep good with melatonin  Cognition intact     Health Maintenance: HIV screen, tetanus immunization     There is no problem list on file for this patient. Review of Systems   Constitutional:  Negative for fatigue and unexpected weight change. HENT:  Negative for congestion, ear pain, sinus pressure and sore throat. Eyes:  Negative for pain and visual disturbance. Respiratory:  Negative for cough, shortness of breath and wheezing. Cardiovascular:  Negative for chest pain, palpitations and leg swelling.    Gastrointestinal:  Negative for abdominal pain, diarrhea, nausea and vomiting. Endocrine: Negative for polyuria. Genitourinary:  Negative for dysuria, frequency, hematuria and urgency. Musculoskeletal:  Negative for back pain and neck pain. Skin:  Negative for rash. Neurological:  Negative for dizziness, seizures, numbness and headaches. Psychiatric/Behavioral:  Negative for agitation, behavioral problems, dysphoric mood, hallucinations, self-injury, sleep disturbance and suicidal ideas. The patient is not nervous/anxious and is not hyperactive. Prior to Visit Medications    Medication Sig Taking? Authorizing Provider   venlafaxine (EFFEXOR XR) 150 MG extended release capsule Take 1 capsule by mouth daily Yes NOEL Nelson   venlafaxine (EFFEXOR XR) 75 MG extended release capsule Take 1 capsule by mouth daily Take with 150mg Yes NOEL Nelson   buPROPion [facility]) 150 MG extended release tablet Take 1 tablet by mouth daily Yes NOEL Nelson   ezetimibe (ZETIA) 10 MG tablet Take 1 tablet by mouth daily Yes NOEL Nelson   rosuvastatin (CRESTOR) 20 MG tablet Take 1 tablet by mouth nightly Yes NOEL Nelson   melatonin 5 MG TABS tablet Take 5 mg by mouth daily Yes Historical Provider, MD   linaclotide Langflorentino Escobar) 145 MCG capsule Take 1 capsule by mouth every morning (before breakfast)  Patient not taking: Reported on 3/16/2023  NOEL Nelson        Allergies   Allergen Reactions    Percocet [Oxycodone-Acetaminophen] Rash       Past Medical History:   Diagnosis Date    Allergic rhinitis        History reviewed. No pertinent surgical history.     Social History     Socioeconomic History    Marital status:      Spouse name: Not on file    Number of children: Not on file    Years of education: Not on file    Highest education level: Not on file   Occupational History    Not on file   Tobacco Use    Smoking status: Never    Smokeless tobacco: Never   Substance and Sexual Activity    Alcohol use: No     Alcohol/week: 0.0 standard drinks    Drug use: No    Sexual activity: Not on file   Other Topics Concern    Not on file   Social History Narrative    Not on file     Social Determinants of Health     Financial Resource Strain: Low Risk     Difficulty of Paying Living Expenses: Not hard at all   Food Insecurity: No Food Insecurity    Worried About Running Out of Food in the Last Year: Never true    Ran Out of Food in the Last Year: Never true   Transportation Needs: Not on file   Physical Activity: Not on file   Stress: Not on file   Social Connections: Not on file   Intimate Partner Violence: Not on file   Housing Stability: Not on file        History reviewed. No pertinent family history. ADVANCE DIRECTIVE: N, <no information>    Vitals:    03/16/23 1513   BP: 112/76   Site: Right Upper Arm   Position: Sitting   Cuff Size: Large Adult   Pulse: 86   Temp: 97.9 °F (36.6 °C)   TempSrc: Temporal   SpO2: 98%   Weight: 147 lb (66.7 kg)     Estimated body mass index is 24.46 kg/m² as calculated from the following:    Height as of 9/20/22: 5' 5\" (1.651 m). Weight as of this encounter: 147 lb (66.7 kg). Physical Exam  Vitals reviewed. Constitutional:       General: She is not in acute distress. Appearance: She is well-developed. She is not toxic-appearing. Comments: Body habitus is normal   HENT:      Head: Normocephalic and atraumatic. Right Ear: Hearing and tympanic membrane normal.      Left Ear: Hearing and tympanic membrane normal.      Mouth/Throat:      Mouth: Mucous membranes are moist.   Eyes:      General: Lids are normal.      Pupils: Pupils are equal, round, and reactive to light. Neck:      Thyroid: No thyromegaly. Vascular: No JVD. Trachea: Phonation normal.   Cardiovascular:      Rate and Rhythm: Normal rate and regular rhythm. No extrasystoles are present. Chest Wall: PMI is not displaced. Pulses: Normal pulses. Heart sounds: Normal heart sounds.  No murmur heard. Pulmonary:      Effort: Pulmonary effort is normal.      Breath sounds: Normal breath sounds. Abdominal:      General: Bowel sounds are normal.      Palpations: Abdomen is soft. Genitourinary:     Comments: Examination deferred  Musculoskeletal:         General: Normal range of motion. Cervical back: Normal range of motion. Comments: Joint examination reveals no acute arthritis or synovitis. Skin:     General: Skin is warm and dry. Capillary Refill: Capillary refill takes less than 2 seconds. Findings: No rash. Neurological:      General: No focal deficit present. Mental Status: She is alert and oriented to person, place, and time. Cranial Nerves: No cranial nerve deficit (by gross examination). Sensory: No sensory deficit. Motor: No tremor or atrophy. Gait: Gait normal.      Comments: No focal deficits appreciated   Psychiatric:         Mood and Affect: Mood normal.         Speech: Speech normal.         Behavior: Behavior normal. Behavior is cooperative. No flowsheet data found.     Lab Results   Component Value Date/Time    CHOL 261 03/22/2022 12:00 AM    CHOL 241 09/02/2020 11:51 AM    CHOLFAST 176 10/27/2020 07:11 AM    TRIG 63 03/22/2022 12:00 AM    TRIG 69 09/02/2020 11:51 AM    TRIGLYCFAST 52 10/27/2020 07:11 AM    HDL 69 03/22/2022 12:00 AM    HDL 56 10/27/2020 07:11 AM    HDL 63 09/02/2020 11:51 AM    LDLCALC 179 03/22/2022 12:00 AM    LDLCALC 110 10/27/2020 07:11 AM    LDLCALC 164 09/02/2020 11:51 AM    GLUCOSE 79 09/27/2022 12:00 AM    LABA1C 5.4 09/27/2022 12:00 AM    LABA1C 5.3 03/22/2022 12:00 AM       The 10-year ASCVD risk score (Bala DAVILA, et al., 2019) is: 1%    Values used to calculate the score:      Age: 46 years      Sex: Female      Is Non- : No      Diabetic: No      Tobacco smoker: No      Systolic Blood Pressure: 898 mmHg      Is BP treated: No      HDL Cholesterol: 69 mg/dL      Total Cholesterol: 261 mg/dL      There is no immunization history on file for this patient. Health Maintenance   Topic Date Due    COVID-19 Vaccine (1) Never done    HIV screen  Never done    Hepatitis C screen  Never done    DTaP/Tdap/Td vaccine (1 - Tdap) Never done    Colorectal Cancer Screen  Never done    Shingles vaccine (1 of 2) Never done    Depression Monitoring  03/15/2023    Lipids  03/22/2023    Breast cancer screen  04/07/2024    Cervical cancer screen  06/16/2027    Flu vaccine  Completed    Hepatitis A vaccine  Aged Out    Hib vaccine  Aged Out    Meningococcal (ACWY) vaccine  Aged Out    Pneumococcal 0-64 years Vaccine  Aged Out       Assessment & Plan   Well adult exam  Cont to monitor    -     CBC with Auto Differential; Future  -     Comprehensive Metabolic Panel; Future  -     Hemoglobin A1C; Future  -     Lipid Panel; Future  -     Vitamin D 25 Hydroxy; Future  -     TSH; Future  -     T4, Free; Future  Recurrent major depressive disorder, in partial remission (HCC)  -     venlafaxine (EFFEXOR XR) 150 MG extended release capsule; Take 1 capsule by mouth daily, Disp-90 capsule, R-3Normal  -     venlafaxine (EFFEXOR XR) 75 MG extended release capsule; Take 1 capsule by mouth daily Take with 150mg, Disp-90 capsule, R-3Normal  Anxiety  -     venlafaxine (EFFEXOR XR) 150 MG extended release capsule; Take 1 capsule by mouth daily, Disp-90 capsule, R-3Normal  -     venlafaxine (EFFEXOR XR) 75 MG extended release capsule; Take 1 capsule by mouth daily Take with 150mg, Disp-90 capsule, R-3Normal  Fatigue, unspecified type  -     buPROPion (WELLBUTRIN SR) 150 MG extended release tablet; Take 1 tablet by mouth daily, Disp-90 tablet, R-3Normal  Mixed hyperlipidemia  -     ezetimibe (ZETIA) 10 MG tablet; Take 1 tablet by mouth daily, Disp-30 tablet, R-5Normal  -     rosuvastatin (CRESTOR) 20 MG tablet;  Take 1 tablet by mouth nightly, Disp-90 tablet, R-3Normal  Vitamin D deficiency  -     Vitamin D 25 Hydroxy; Future  Return in about 1 month (around 4/16/2023) for yearly check up .          --Anton Rowe, APRN

## 2023-04-20 DIAGNOSIS — F41.9 ANXIETY: ICD-10-CM

## 2023-04-20 DIAGNOSIS — F33.41 RECURRENT MAJOR DEPRESSIVE DISORDER, IN PARTIAL REMISSION (HCC): ICD-10-CM

## 2023-04-20 DIAGNOSIS — E78.2 MIXED HYPERLIPIDEMIA: ICD-10-CM

## 2023-04-21 RX ORDER — EZETIMIBE 10 MG/1
10 TABLET ORAL DAILY
Qty: 90 TABLET | Refills: 3 | Status: SHIPPED | OUTPATIENT
Start: 2023-04-21

## 2023-04-21 RX ORDER — VENLAFAXINE HYDROCHLORIDE 75 MG/1
75 CAPSULE, EXTENDED RELEASE ORAL DAILY
Qty: 90 CAPSULE | Refills: 3 | Status: SHIPPED | OUTPATIENT
Start: 2023-04-21

## 2023-04-21 RX ORDER — VENLAFAXINE HYDROCHLORIDE 150 MG/1
150 CAPSULE, EXTENDED RELEASE ORAL DAILY
Qty: 90 CAPSULE | Refills: 3 | Status: SHIPPED | OUTPATIENT
Start: 2023-04-21

## 2023-04-21 NOTE — TELEPHONE ENCOUNTER
Received fax from pharmacy requesting refill on pts medication(s). Pt was last seen in office on 3/16/2023  and has a follow up scheduled for Visit date not found. Will send request to  Danielito Werner  for authorization.      Requested Prescriptions     Pending Prescriptions Disp Refills    venlafaxine (EFFEXOR XR) 150 MG extended release capsule [Pharmacy Med Name: Venlafaxine HCl  MG Oral Capsule Extended Release 24 Hour] 90 capsule 0     Sig: Take 1 capsule by mouth once daily    venlafaxine (EFFEXOR XR) 75 MG extended release capsule [Pharmacy Med Name: Venlafaxine HCl ER 75 MG Oral Capsule Extended Release 24 Hour] 90 capsule 0     Sig: Take 1 capsule by mouth once daily    ezetimibe (ZETIA) 10 MG tablet [Pharmacy Med Name: Ezetimibe 10 MG Oral Tablet] 90 tablet 0     Sig: Take 1 tablet by mouth once daily

## 2023-05-08 ENCOUNTER — TELEPHONE (OUTPATIENT)
Dept: FAMILY MEDICINE CLINIC | Age: 48
End: 2023-05-08

## 2023-05-08 LAB
ALBUMIN SERPL-MCNC: 3.5 G/DL
ALP BLD-CCNC: 77 U/L
ALT SERPL-CCNC: 34 U/L
ANION GAP SERPL CALCULATED.3IONS-SCNC: 5.6 MMOL/L
AST SERPL-CCNC: 20 U/L
AVERAGE GLUCOSE: NORMAL
BASOPHILS ABSOLUTE: 0.04 /ΜL
BASOPHILS RELATIVE PERCENT: 0.5 %
BILIRUB SERPL-MCNC: 0.31 MG/DL (ref 0.1–1.4)
BUN BLDV-MCNC: 15 MG/DL
CALCIUM SERPL-MCNC: 8.5 MG/DL
CHLORIDE BLD-SCNC: 103 MMOL/L
CHOLESTEROL, TOTAL: 168 MG/DL
CHOLESTEROL/HDL RATIO: ABNORMAL
CO2: 28.4 MMOL/L
CREAT SERPL-MCNC: 0.75 MG/DL
EGFR: 60
EOSINOPHILS ABSOLUTE: 0.13 /ΜL
EOSINOPHILS RELATIVE PERCENT: 1.7 %
GLUCOSE BLD-MCNC: 84 MG/DL
HBA1C MFR BLD: 5.2 %
HCT VFR BLD CALC: 41.9 % (ref 36–46)
HDLC SERPL-MCNC: 69 MG/DL (ref 35–70)
HEMOGLOBIN: 13.7 G/DL (ref 12–16)
LDL CHOLESTEROL CALCULATED: 89 MG/DL (ref 0–160)
LYMPHOCYTES ABSOLUTE: 2.19 /ΜL
LYMPHOCYTES RELATIVE PERCENT: 28.1 %
MCH RBC QN AUTO: 31.4 PG
MCHC RBC AUTO-ENTMCNC: 32.7 G/DL
MCV RBC AUTO: 96.1 FL
MONOCYTES ABSOLUTE: 0.56 /ΜL
MONOCYTES RELATIVE PERCENT: 7.2 %
NEUTROPHILS ABSOLUTE: 4.84 /ΜL
NEUTROPHILS RELATIVE PERCENT: 62 %
NONHDLC SERPL-MCNC: ABNORMAL MG/DL
PDW BLD-RTO: 11.9 %
PLATELET # BLD: 320 K/ΜL
PMV BLD AUTO: 10.1 FL
POTASSIUM SERPL-SCNC: 3.58 MMOL/L
RBC # BLD: 4.36 10^6/ΜL
SODIUM BLD-SCNC: 137 MMOL/L
T4 FREE: 0.87
TOTAL PROTEIN: 7
TRIGL SERPL-MCNC: 50 MG/DL
TSH SERPL DL<=0.05 MIU/L-ACNC: 1.36 UIU/ML
VITAMIN D 25-HYDROXY: 30.7
VITAMIN D2, 25 HYDROXY: NORMAL
VITAMIN D3,25 HYDROXY: NORMAL
VLDLC SERPL CALC-MCNC: ABNORMAL MG/DL
WBC # BLD: 7.8 10^3/ML

## 2023-05-08 NOTE — TELEPHONE ENCOUNTER
----- Message from NOEL Winters sent at 5/8/2023  2:28 PM CDT -----  Lipids at goal great no changes  Vitamin d normal but low suggest vitamin d daily  Thyroid wnl  A1c normal glucose control

## 2023-05-08 NOTE — TELEPHONE ENCOUNTER
Called patient, spoke with: Patient regarding the results of the patients most recent labs. I advised Patient of Chetie Spotted recommendations.    Patient did voice understanding

## 2023-05-08 NOTE — TELEPHONE ENCOUNTER
----- Message from NOEL Cornelius sent at 5/8/2023  2:00 PM CDT -----  Cmp electrolytes liver and kidneys wnl

## 2023-05-08 NOTE — TELEPHONE ENCOUNTER
----- Message from NOEL Atkinson sent at 5/8/2023  1:57 PM CDT -----  CBC normal no anemia or concerns

## 2024-07-15 DIAGNOSIS — F41.9 ANXIETY: ICD-10-CM

## 2024-07-15 DIAGNOSIS — R53.83 FATIGUE, UNSPECIFIED TYPE: ICD-10-CM

## 2024-07-15 DIAGNOSIS — E78.2 MIXED HYPERLIPIDEMIA: ICD-10-CM

## 2024-07-15 DIAGNOSIS — K58.1 IRRITABLE BOWEL SYNDROME WITH CONSTIPATION: ICD-10-CM

## 2024-07-15 DIAGNOSIS — F33.41 RECURRENT MAJOR DEPRESSIVE DISORDER, IN PARTIAL REMISSION (HCC): ICD-10-CM

## 2024-07-15 RX ORDER — ROSUVASTATIN CALCIUM 20 MG/1
20 TABLET, COATED ORAL NIGHTLY
Qty: 90 TABLET | Refills: 3 | Status: SHIPPED | OUTPATIENT
Start: 2024-07-15

## 2024-07-15 RX ORDER — BUPROPION HYDROCHLORIDE 150 MG/1
150 TABLET, EXTENDED RELEASE ORAL DAILY
Qty: 90 TABLET | Refills: 3 | Status: SHIPPED | OUTPATIENT
Start: 2024-07-15

## 2024-07-15 RX ORDER — VENLAFAXINE HYDROCHLORIDE 75 MG/1
75 CAPSULE, EXTENDED RELEASE ORAL DAILY
Qty: 90 CAPSULE | Refills: 3 | Status: SHIPPED | OUTPATIENT
Start: 2024-07-15

## 2024-07-15 RX ORDER — EZETIMIBE 10 MG/1
10 TABLET ORAL DAILY
Qty: 90 TABLET | Refills: 3 | Status: SHIPPED | OUTPATIENT
Start: 2024-07-15

## 2024-07-15 NOTE — TELEPHONE ENCOUNTER
Received My Chart Message from patient requesting refill on medication(s). Pt was last seen in office on 3/16/2023  and has a follow up scheduled for 7/25/2024. Will send request to provider for authorization.     Requested Prescriptions     Pending Prescriptions Disp Refills    linaclotide (LINZESS) 145 MCG capsule 30 capsule 11     Sig: Take 1 capsule by mouth every morning (before breakfast)    buPROPion (WELLBUTRIN SR) 150 MG extended release tablet 90 tablet 3     Sig: Take 1 tablet by mouth daily    rosuvastatin (CRESTOR) 20 MG tablet 90 tablet 3     Sig: Take 1 tablet by mouth nightly    venlafaxine (EFFEXOR XR) 75 MG extended release capsule 90 capsule 3     Sig: Take 1 capsule by mouth daily    ezetimibe (ZETIA) 10 MG tablet 90 tablet 3     Sig: Take 1 tablet by mouth daily

## 2024-08-09 ENCOUNTER — OFFICE VISIT (OUTPATIENT)
Dept: FAMILY MEDICINE CLINIC | Age: 49
End: 2024-08-09
Payer: COMMERCIAL

## 2024-08-09 VITALS
OXYGEN SATURATION: 98 % | TEMPERATURE: 98.1 F | HEART RATE: 98 BPM | BODY MASS INDEX: 24.87 KG/M2 | WEIGHT: 149.25 LBS | DIASTOLIC BLOOD PRESSURE: 78 MMHG | SYSTOLIC BLOOD PRESSURE: 108 MMHG | HEIGHT: 65 IN

## 2024-08-09 DIAGNOSIS — F41.9 ANXIETY: ICD-10-CM

## 2024-08-09 DIAGNOSIS — Z00.00 ENCOUNTER FOR WELL ADULT EXAM WITHOUT ABNORMAL FINDINGS: Primary | ICD-10-CM

## 2024-08-09 DIAGNOSIS — R53.83 FATIGUE, UNSPECIFIED TYPE: ICD-10-CM

## 2024-08-09 DIAGNOSIS — F33.42 RECURRENT MAJOR DEPRESSIVE DISORDER, IN FULL REMISSION (HCC): ICD-10-CM

## 2024-08-09 DIAGNOSIS — E78.2 MIXED HYPERLIPIDEMIA: ICD-10-CM

## 2024-08-09 DIAGNOSIS — K58.1 IRRITABLE BOWEL SYNDROME WITH CONSTIPATION: ICD-10-CM

## 2024-08-09 PROCEDURE — 99396 PREV VISIT EST AGE 40-64: CPT | Performed by: NURSE PRACTITIONER

## 2024-08-09 RX ORDER — ROSUVASTATIN CALCIUM 20 MG/1
20 TABLET, COATED ORAL NIGHTLY
Qty: 90 TABLET | Refills: 3 | Status: SHIPPED | OUTPATIENT
Start: 2024-08-09

## 2024-08-09 RX ORDER — VENLAFAXINE HYDROCHLORIDE 75 MG/1
75 CAPSULE, EXTENDED RELEASE ORAL DAILY
Qty: 90 CAPSULE | Refills: 3 | Status: SHIPPED | OUTPATIENT
Start: 2024-08-09

## 2024-08-09 RX ORDER — EZETIMIBE 10 MG/1
10 TABLET ORAL DAILY
Qty: 90 TABLET | Refills: 3 | Status: SHIPPED | OUTPATIENT
Start: 2024-08-09

## 2024-08-09 RX ORDER — BUPROPION HYDROCHLORIDE 150 MG/1
150 TABLET, EXTENDED RELEASE ORAL DAILY
Qty: 90 TABLET | Refills: 3 | Status: SHIPPED | OUTPATIENT
Start: 2024-08-09

## 2024-08-09 RX ORDER — VENLAFAXINE HYDROCHLORIDE 150 MG/1
150 CAPSULE, EXTENDED RELEASE ORAL DAILY
Qty: 90 CAPSULE | Refills: 3 | Status: SHIPPED | OUTPATIENT
Start: 2024-08-09

## 2024-08-09 SDOH — ECONOMIC STABILITY: FOOD INSECURITY: WITHIN THE PAST 12 MONTHS, YOU WORRIED THAT YOUR FOOD WOULD RUN OUT BEFORE YOU GOT MONEY TO BUY MORE.: NEVER TRUE

## 2024-08-09 SDOH — ECONOMIC STABILITY: FOOD INSECURITY: WITHIN THE PAST 12 MONTHS, THE FOOD YOU BOUGHT JUST DIDN'T LAST AND YOU DIDN'T HAVE MONEY TO GET MORE.: NEVER TRUE

## 2024-08-09 SDOH — ECONOMIC STABILITY: HOUSING INSECURITY
IN THE LAST 12 MONTHS, WAS THERE A TIME WHEN YOU DID NOT HAVE A STEADY PLACE TO SLEEP OR SLEPT IN A SHELTER (INCLUDING NOW)?: NO

## 2024-08-09 SDOH — ECONOMIC STABILITY: INCOME INSECURITY: HOW HARD IS IT FOR YOU TO PAY FOR THE VERY BASICS LIKE FOOD, HOUSING, MEDICAL CARE, AND HEATING?: NOT HARD AT ALL

## 2024-08-09 ASSESSMENT — ENCOUNTER SYMPTOMS
SORE THROAT: 0
EYE PAIN: 0
BACK PAIN: 0
SHORTNESS OF BREATH: 0
COUGH: 0
SINUS PRESSURE: 0
ABDOMINAL PAIN: 0
WHEEZING: 0
DIARRHEA: 0
VOMITING: 0
NAUSEA: 0
CONSTIPATION: 1

## 2024-08-09 ASSESSMENT — PATIENT HEALTH QUESTIONNAIRE - PHQ9
SUM OF ALL RESPONSES TO PHQ9 QUESTIONS 1 & 2: 0
5. POOR APPETITE OR OVEREATING: NOT AT ALL
1. LITTLE INTEREST OR PLEASURE IN DOING THINGS: NOT AT ALL
10. IF YOU CHECKED OFF ANY PROBLEMS, HOW DIFFICULT HAVE THESE PROBLEMS MADE IT FOR YOU TO DO YOUR WORK, TAKE CARE OF THINGS AT HOME, OR GET ALONG WITH OTHER PEOPLE: NOT DIFFICULT AT ALL
SUM OF ALL RESPONSES TO PHQ QUESTIONS 1-9: 0
8. MOVING OR SPEAKING SO SLOWLY THAT OTHER PEOPLE COULD HAVE NOTICED. OR THE OPPOSITE, BEING SO FIGETY OR RESTLESS THAT YOU HAVE BEEN MOVING AROUND A LOT MORE THAN USUAL: NOT AT ALL
SUM OF ALL RESPONSES TO PHQ QUESTIONS 1-9: 0
SUM OF ALL RESPONSES TO PHQ QUESTIONS 1-9: 0
4. FEELING TIRED OR HAVING LITTLE ENERGY: NOT AT ALL
2. FEELING DOWN, DEPRESSED OR HOPELESS: NOT AT ALL
9. THOUGHTS THAT YOU WOULD BE BETTER OFF DEAD, OR OF HURTING YOURSELF: NOT AT ALL
6. FEELING BAD ABOUT YOURSELF - OR THAT YOU ARE A FAILURE OR HAVE LET YOURSELF OR YOUR FAMILY DOWN: NOT AT ALL
3. TROUBLE FALLING OR STAYING ASLEEP: NOT AT ALL
SUM OF ALL RESPONSES TO PHQ QUESTIONS 1-9: 0
7. TROUBLE CONCENTRATING ON THINGS, SUCH AS READING THE NEWSPAPER OR WATCHING TELEVISION: NOT AT ALL

## 2024-08-09 NOTE — PROGRESS NOTES
Well Adult Note  Name: Verona Higuera Today’s Date: 2024   MRN: [id number] Sex: Female   Age: 49 y.o. Ethnicity: Non- / Non    : 1975 Race: White (non-)      Verona Higuera is here for a well adult exam.       Subjective   History:  Patient is here for yearly check up  Major depressive disorder:  Medication:              Effexor  mg (150 mg and 75 mg) daily  Along with wellbutrin 150mg in the am   Symptoms:doing fairly well.        B12 deficiency:  Medication:              Vitamin B12 1000 mcg orally daily  Symptoms:some fatigue  Isn't a morning person        Environmental allergies:  Medication:              Zyrtec 10 mg daily  Symptoms:controlled      Hyperlipidemia:   Medication:   eztemibe (Zetia) and rosuvastatin (Crestor)  Low Fat, Low Choleterol Diet:  no  Myalgias or GI upset: no  The patient exercises intermittently.  Family history of CAD and /or stroke: yes  Personal LDL goal:70  Barrier to success: none  Laboratory:    Lab Results   Component Value Date    CHOL 168 2023    TRIG 50 2023    HDL 69 (H) 2023      Lab Results   Component Value Date    ALT 34 2023    AST 20 2023       Insomnia:  Medication:              Melatonin 5 mg nightly  Symptoms: doing well     Vitamin d deficiency   Last check 2022 low of normal   Due to check again     Eyes:  regularly  Dentist:  regularly  Skin:   No pathologic moles  SBE:  Yes, regularly  Mammo:  Done today at [facility] with health department    Pap:  Done today  Menses regular  Colonoscopy:  up to date linzess constipation   Dexa Scan:  No need  Calcium & Vit. D:  No need  MVI:  occasionally  Supplements:  b12 prn  Asa:  no  Labs:  due  Exercise:  She is doing a lot of activities working around the house and working  [employer]: denies  Diet and Nutr: no special.   Depression:  Controlled on present regimen  Fall:  no  EOL:  No but discussed with spouse.  Tobacco:  no  Substance:  no  Immunizations:  Needs

## 2024-09-16 ENCOUNTER — TELEPHONE (OUTPATIENT)
Dept: FAMILY MEDICINE CLINIC | Age: 49
End: 2024-09-16

## 2024-09-16 DIAGNOSIS — Z00.00 ENCOUNTER FOR WELL ADULT EXAM WITHOUT ABNORMAL FINDINGS: Primary | ICD-10-CM

## 2024-09-16 LAB
ALBUMIN: 3.4 G/DL
ALP BLD-CCNC: 84 U/L
ALT SERPL-CCNC: 31 U/L
ANION GAP SERPL CALCULATED.3IONS-SCNC: 8.8 MMOL/L
AST SERPL-CCNC: 18 U/L
BASOPHILS ABSOLUTE: 0.03 /ΜL
BASOPHILS RELATIVE PERCENT: 0.4 %
BILIRUB SERPL-MCNC: 0.31 MG/DL (ref 0.1–1.4)
BUN BLDV-MCNC: 17 MG/DL
CALCIUM SERPL-MCNC: 8.9 MG/DL
CHLORIDE BLD-SCNC: 105 MMOL/L
CHOLESTEROL, TOTAL: 164 MG/DL
CHOLESTEROL/HDL RATIO: ABNORMAL
CO2: 28.2 MMOL/L
CREAT SERPL-MCNC: 0.88 MG/DL
CREATININE URINE: 9.4 MG/DL
EOSINOPHILS ABSOLUTE: 0.14 /ΜL
EOSINOPHILS RELATIVE PERCENT: 1.8 %
ESTIMATED AVERAGE GLUCOSE: NORMAL
GFR, ESTIMATED: NORMAL
GLUCOSE BLD-MCNC: 80 MG/DL
HBA1C MFR BLD: 5.4 %
HCT VFR BLD CALC: 42.9 % (ref 36–46)
HDLC SERPL-MCNC: 71 MG/DL (ref 35–70)
HEMOGLOBIN: 14.1 G/DL (ref 12–16)
LDL CHOLESTEROL: 90
LYMPHOCYTES ABSOLUTE: 2.14 /ΜL
LYMPHOCYTES RELATIVE PERCENT: 27.2 %
MCH RBC QN AUTO: 31.3 PG
MCHC RBC AUTO-ENTMCNC: 32.9 G/DL
MCV RBC AUTO: 95.3 FL
MICROALBUMIN/CREAT 24H UR: 8.8 MG/DL
MICROALBUMIN/CREAT UR-RTO: 9.2 MG/G
MONOCYTES ABSOLUTE: 0.59 /ΜL
MONOCYTES RELATIVE PERCENT: 7.5 %
NEUTROPHILS ABSOLUTE: 4.94 /ΜL
NEUTROPHILS RELATIVE PERCENT: 62.7 %
NONHDLC SERPL-MCNC: ABNORMAL MG/DL
PDW BLD-RTO: 11.9 %
PLATELET # BLD: 276 K/ΜL
PMV BLD AUTO: 10.2 FL
POTASSIUM SERPL-SCNC: 3.9 MMOL/L
RBC # BLD: 4.5 10^6/ΜL
SODIUM BLD-SCNC: 142 MMOL/L
T4 FREE: 0.69
TOTAL PROTEIN: 6.8 G/DL (ref 6.4–8.2)
TRIGL SERPL-MCNC: 63 MG/DL
TSH SERPL DL<=0.05 MIU/L-ACNC: 1.43 UIU/ML
VLDLC SERPL CALC-MCNC: ABNORMAL MG/DL
WBC # BLD: 7.9 10^3/ML

## 2024-09-18 DIAGNOSIS — R53.83 FATIGUE, UNSPECIFIED TYPE: Primary | ICD-10-CM

## 2024-09-18 LAB
VITAMIN D 25-HYDROXY: 39.1
VITAMIN D2, 25 HYDROXY: NORMAL
VITAMIN D3,25 HYDROXY: NORMAL